# Patient Record
Sex: MALE | Race: OTHER | NOT HISPANIC OR LATINO
[De-identification: names, ages, dates, MRNs, and addresses within clinical notes are randomized per-mention and may not be internally consistent; named-entity substitution may affect disease eponyms.]

---

## 2022-12-15 ENCOUNTER — APPOINTMENT (OUTPATIENT)
Dept: COLORECTAL SURGERY | Facility: CLINIC | Age: 82
End: 2022-12-15
Payer: MEDICARE

## 2022-12-15 VITALS
WEIGHT: 158 LBS | OXYGEN SATURATION: 100 % | HEIGHT: 67 IN | SYSTOLIC BLOOD PRESSURE: 150 MMHG | BODY MASS INDEX: 24.8 KG/M2 | HEART RATE: 91 BPM | TEMPERATURE: 97 F | DIASTOLIC BLOOD PRESSURE: 71 MMHG

## 2022-12-15 DIAGNOSIS — Z80.0 FAMILY HISTORY OF MALIGNANT NEOPLASM OF DIGESTIVE ORGANS: ICD-10-CM

## 2022-12-15 DIAGNOSIS — G57.93 UNSPECIFIED MONONEUROPATHY OF BILATERAL LOWER LIMBS: ICD-10-CM

## 2022-12-15 DIAGNOSIS — E13.9 OTHER SPECIFIED DIABETES MELLITUS W/OUT COMPLICATIONS: ICD-10-CM

## 2022-12-15 DIAGNOSIS — Z86.79 PERSONAL HISTORY OF OTHER DISEASES OF THE CIRCULATORY SYSTEM: ICD-10-CM

## 2022-12-15 DIAGNOSIS — Z86.39 PERSONAL HISTORY OF OTHER ENDOCRINE, NUTRITIONAL AND METABOLIC DISEASE: ICD-10-CM

## 2022-12-15 DIAGNOSIS — K63.5 POLYP OF COLON: ICD-10-CM

## 2022-12-15 DIAGNOSIS — Z87.891 PERSONAL HISTORY OF NICOTINE DEPENDENCE: ICD-10-CM

## 2022-12-15 DIAGNOSIS — Z83.3 FAMILY HISTORY OF DIABETES MELLITUS: ICD-10-CM

## 2022-12-15 PROBLEM — Z00.00 ENCOUNTER FOR PREVENTIVE HEALTH EXAMINATION: Status: ACTIVE | Noted: 2022-12-15

## 2022-12-15 PROCEDURE — 99204 OFFICE O/P NEW MOD 45 MIN: CPT

## 2022-12-15 RX ORDER — APIXABAN 2.5 MG/1
2.5 TABLET, FILM COATED ORAL
Refills: 0 | Status: ACTIVE | COMMUNITY

## 2022-12-15 RX ORDER — ROSUVASTATIN CALCIUM 5 MG/1
5 TABLET, FILM COATED ORAL DAILY
Refills: 0 | Status: ACTIVE | COMMUNITY

## 2022-12-15 RX ORDER — GABAPENTIN 600 MG/1
600 TABLET, COATED ORAL 3 TIMES DAILY
Refills: 0 | Status: ACTIVE | COMMUNITY

## 2022-12-15 RX ORDER — METFORMIN HYDROCHLORIDE 1000 MG/1
1000 TABLET, COATED ORAL
Refills: 0 | Status: ACTIVE | COMMUNITY

## 2022-12-15 RX ORDER — METOPROLOL SUCCINATE 25 MG/1
25 TABLET, EXTENDED RELEASE ORAL DAILY
Refills: 0 | Status: ACTIVE | COMMUNITY

## 2022-12-15 RX ORDER — DULAGLUTIDE 4.5 MG/.5ML
INJECTION, SOLUTION SUBCUTANEOUS
Refills: 0 | Status: ACTIVE | COMMUNITY

## 2022-12-15 RX ORDER — CLOPIDOGREL 75 MG/1
75 TABLET, FILM COATED ORAL DAILY
Refills: 0 | Status: ACTIVE | COMMUNITY

## 2022-12-15 RX ORDER — LISINOPRIL 10 MG/1
10 TABLET ORAL DAILY
Refills: 0 | Status: ACTIVE | COMMUNITY

## 2022-12-15 RX ORDER — FUROSEMIDE 20 MG/1
20 TABLET ORAL DAILY
Refills: 0 | Status: ACTIVE | COMMUNITY

## 2022-12-15 RX ORDER — ACETAMINOPHEN 500 MG/1
500 TABLET ORAL
Refills: 0 | Status: ACTIVE | COMMUNITY

## 2022-12-15 NOTE — REASON FOR VISIT
[Initial Evaluation] : an initial evaluation [Family Member] : family member [FreeTextEntry1] : Referred by Dr. Ortiz

## 2022-12-15 NOTE — ASSESSMENT
[FreeTextEntry1] : 82 year old on plavix and eliquis who was found to have 2 cm transverse colon and 35 mm sessile cecal polyp on recently colonoscopy.  Bx not done because of anticoagulation and anti platelet agents.  Sent for evaluation.\par Patient is asymptomatic but tends to be bit constipated at times.\par \par He is active and ambulates out of house frequently.  Can walk 3 blocks.  \par \par Recommended full medical clearance, cessation of eliquis and plavix and then colonoscopy and possible ESD/EMR in OR.  If scope removal not possible then immediate right or extended right colecotomy (to get transverse colon polyp if not removable with scope.\par \par PMD and cardiac clearance needed.  \par \par Option of doing nothing discussed. \par risks and benefits discussed (leak, abscess, bleeding, transfusion, medical and other risks).\par topic of anticoagulation, anti platelet agents.  ? need for lovenox periop.\par \par Drawings and all handouts given.   \par

## 2022-12-15 NOTE — HISTORY OF PRESENT ILLNESS
[FreeTextEntry1] : PMD: Tayo Vidal, \par Cardiologist: José \par \par 83 y/o M, referred by Dr. Ortiz after Polyps found on colonoscopy. Colonoscopy was done on 8/2/22 by Dr. Ortiz and findings were a 19mm sessile polyp in the transverse colon and 35mm sessile polypoid lesion in the ascending colon. No biopsy done. Patient is here to consult to see if he needs surgery. \par Denies abdominal pain, n/v/d or constipation. Denies rectal bleeding. Bowel movements are daily and they are soft and large. Evacuation is complete. Denies need to strain. Hx. of hemorrhoids. Used cream in the past and was resolved. No surgery for hemorrhoids. Denies any changes in bowel habits. \par Had Partial gastrectomy in 1960s for ulcer. \par \par \par PSH\par CABG  2009\par Gastrectomy  1968 for PUD\par Corneal transplant\par Other eye surgery\par inguinal hernia repair  long time ago\par \par PMH\par DVT  last year\par \par Med\par Elqiuis\par plavix\par metformin\par trulicity weekly\par lisinopril\par metoprolol\par lasix\par statin\par \par Allergies: none\par \par PMH:\par DVT Right leg (last 1-2 years)\par card: no MI, + CAD and CABG\par pulm: former smoker, no smoking x 20-30 years.  No asthma, bronchtis\par +DM x many years  (checks finger sticks intermittently)\par no CVA or seizure.  No TIA\par no healing issues\par : no stones, nocuturia 3-4 x/night, no UTI, no hematuria

## 2022-12-15 NOTE — PHYSICAL EXAM
[Nonprolapsing] : a nonprolapsing (grade I) [Skin Tags] : residual hemorrhoidal skin tags were noted [Normal] : was normal [None] : there was no rectal mass  [Normal Breath Sounds] : Normal breath sounds [Normal Heart Sounds] : normal heart sounds [2+] : left 2+ [No Rash or Lesion] : No rash or lesion [Alert] : alert [Oriented to Person] : oriented to person [Oriented to Place] : oriented to place [Oriented to Time] : oriented to time [Calm] : calm [Abdomen Masses] : No abdominal masses [Abdomen Tenderness] : ~T No ~M abdominal tenderness [Excoriation] : no perianal excoriation [Fistula] : no fistulas [Wart] : no warts [Ulcer ___ cm] : no ulcers [Tender, Swollen] : nontender, non-swollen [Thrombosed] : that was not thrombosed [Stool Sample Taken] : no stool obtained on rectal exam [Gross Blood] : no gross blood [de-identified] : upper midline scar, non tender, moderately obese, non tender, no groin nodes [de-identified] : few tags. Digital: rectum 60% filled with stool, no masses, tone WNL [de-identified] : NAD

## 2022-12-15 NOTE — REVIEW OF SYSTEMS
[Eye Pain] : eye pain [Eyesight Problems] : eyesight problems [SOB on Exertion] : shortness of breath during exertion [Limb Swelling] : limb swelling [Depression] : depression [Easy Bleeding] : a tendency for easy bleeding [Easy Bruising] : a tendency for easy bruising [Negative] : Endocrine [Fever] : no fever [Chills] : no chills [Feeling Poorly] : not feeling poorly [Feeling Tired] : not feeling tired [Recent Weight Gain (___ Lbs)] : no recent weight gain [Recent Weight Loss (___ Lbs)] : no recent weight loss [Earache] : no earache [Loss Of Hearing] : no hearing loss [Nosebleeds] : no nosebleeds [Nasal Discharge] : no nasal discharge [Sore Throat] : no sore throat [Hoarseness] : no hoarseness [Heart Rate Is Slow] : the heart rate was not slow [Heart Rate Is Fast] : the heart rate was not fast [Chest Pain] : no chest pain [Palpitations] : no palpitations [Leg Claudication] : no intermittent leg claudication [Lower Ext Edema] : no extremity edema [Shortness Of Breath] : no shortness of breath [Wheezing] : no wheezing [Cough] : no cough [Orthopnea] : no orthopnea [PND] : no PND [Abdominal Pain] : no abdominal pain [Vomiting] : no vomiting [Constipation] : no constipation [Diarrhea] : no diarrhea [Heartburn] : no heartburn [Melena] : no melena [Skin Lesions] : no skin lesions [Skin Wound] : no skin wound [Itching] : no itching [Change In A Mole] : no change in a mole [Dry Skin] : no dry skin [An Unusual Growth] : no unusual growth on the skin [Confused] : no confusion [Convulsions] : no convulsions [Dizziness] : no dizziness [Fainting] : no fainting [Limb Weakness] : no limb weakness [Difficulty Walking] : no difficulty walking [Suicidal] : not suicidal [Sleep Disturbances] : no sleep disturbances [Anxiety] : no anxiety [Change In Personality] : no personality change [Emotional Problems] : no emotional problems [FreeTextEntry6] : Able to walk only 2-3 blocks and stop for 2 minutes to catch breath.  [FreeTextEntry3] : Right eye removed.  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 Has pain in the left eye sometimes.  [FreeTextEntry9] : B/L LE swelling. Taking Laxis [de-identified] : Thinks he is mildly depressed but not on medication.  [de-identified] : On Plavix and eliquis

## 2022-12-27 DIAGNOSIS — Z20.822 ENCOUNTER FOR PREPROCEDURAL LABORATORY EXAMINATION: ICD-10-CM

## 2022-12-27 DIAGNOSIS — Z01.812 ENCOUNTER FOR PREPROCEDURAL LABORATORY EXAMINATION: ICD-10-CM

## 2022-12-27 RX ORDER — METRONIDAZOLE 500 MG/1
500 TABLET ORAL
Qty: 6 | Refills: 0 | Status: ACTIVE | COMMUNITY
Start: 2022-12-27 | End: 1900-01-01

## 2022-12-27 RX ORDER — NEOMYCIN SULFATE 500 MG/1
500 TABLET ORAL
Qty: 6 | Refills: 0 | Status: ACTIVE | COMMUNITY
Start: 2022-12-27 | End: 1900-01-01

## 2023-01-04 ENCOUNTER — NON-APPOINTMENT (OUTPATIENT)
Age: 83
End: 2023-01-04

## 2023-01-06 ENCOUNTER — OUTPATIENT (OUTPATIENT)
Dept: OUTPATIENT SERVICES | Facility: HOSPITAL | Age: 83
LOS: 1 days | End: 2023-01-06

## 2023-01-06 DIAGNOSIS — Z01.818 ENCOUNTER FOR OTHER PREPROCEDURAL EXAMINATION: ICD-10-CM

## 2023-01-09 LAB
ANION GAP SERPL CALC-SCNC: 14 MMOL/L
BASOPHILS # BLD AUTO: 0.05 K/UL
BASOPHILS NFR BLD AUTO: 0.6 %
BUN SERPL-MCNC: 21 MG/DL
CALCIUM SERPL-MCNC: 9.5 MG/DL
CHLORIDE SERPL-SCNC: 111 MMOL/L
CO2 SERPL-SCNC: 22 MMOL/L
CREAT SERPL-MCNC: 1.02 MG/DL
EGFR: 74 ML/MIN/1.73M2
EOSINOPHIL # BLD AUTO: 0.57 K/UL
EOSINOPHIL NFR BLD AUTO: 7.2 %
GLUCOSE SERPL-MCNC: 125 MG/DL
HCT VFR BLD CALC: 33.1 %
HGB BLD-MCNC: 9.5 G/DL
IMM GRANULOCYTES NFR BLD AUTO: 0.6 %
LYMPHOCYTES # BLD AUTO: 1.02 K/UL
LYMPHOCYTES NFR BLD AUTO: 12.9 %
MAN DIFF?: NORMAL
MCHC RBC-ENTMCNC: 25.1 PG
MCHC RBC-ENTMCNC: 28.7 GM/DL
MCV RBC AUTO: 87.6 FL
MONOCYTES # BLD AUTO: 0.75 K/UL
MONOCYTES NFR BLD AUTO: 9.5 %
NEUTROPHILS # BLD AUTO: 5.48 K/UL
NEUTROPHILS NFR BLD AUTO: 69.2 %
PLATELET # BLD AUTO: 290 K/UL
POTASSIUM SERPL-SCNC: 5.5 MMOL/L
RBC # BLD: 3.78 M/UL
RBC # FLD: 17.4 %
SARS-COV-2 N GENE NPH QL NAA+PROBE: DETECTED
SODIUM SERPL-SCNC: 147 MMOL/L
WBC # FLD AUTO: 7.92 K/UL

## 2023-01-11 PROBLEM — I10 ESSENTIAL (PRIMARY) HYPERTENSION: Chronic | Status: ACTIVE | Noted: 2023-01-09

## 2023-01-11 PROBLEM — I25.10 ATHEROSCLEROTIC HEART DISEASE OF NATIVE CORONARY ARTERY WITHOUT ANGINA PECTORIS: Chronic | Status: ACTIVE | Noted: 2023-01-09

## 2023-01-11 PROBLEM — E78.5 HYPERLIPIDEMIA, UNSPECIFIED: Chronic | Status: ACTIVE | Noted: 2023-01-09

## 2023-01-11 PROBLEM — G62.9 POLYNEUROPATHY, UNSPECIFIED: Chronic | Status: ACTIVE | Noted: 2023-01-09

## 2023-01-16 VITALS
OXYGEN SATURATION: 99 % | HEART RATE: 94 BPM | DIASTOLIC BLOOD PRESSURE: 75 MMHG | WEIGHT: 150.58 LBS | TEMPERATURE: 98 F | HEIGHT: 66 IN | RESPIRATION RATE: 18 BRPM | SYSTOLIC BLOOD PRESSURE: 133 MMHG

## 2023-01-16 NOTE — H&P ADULT - HISTORY OF PRESENT ILLNESS
82 year old on plavix and eliquis who was found to have 2 cm transverse colon and 35 mm sessile cecal polyp on recently colonoscopy. Bx not done because of anticoagulation and anti platelet agents. Sent for evaluation.  Patient is asymptomatic but tends to be bit constipated at times.    He is active and ambulates out of house frequently. Can walk 3 blocks.       PSH  CABG 2009  Gastrectomy 1968 for PUD  Corneal transplant  Other eye surgery  inguinal hernia repair long time ago    PMH  DVT last year    Med  Elqiuis  plavix  metformin  trulicity weekly  lisinopril  metoprolol  lasix  statin    Allergies: none

## 2023-01-16 NOTE — ASU PREOP CHECKLIST - SELECT TESTS ORDERED
CBC/CMP/PT/PTT/INR HgBA1C 7.0/CBC/CMP/PT/PTT/INR/EKG/COVID-19 HgBA1C 7.0// FS/CBC/CMP/PT/PTT/INR/Type and Screen/EKG/POCT Blood Glucose/COVID-19

## 2023-01-16 NOTE — H&P ADULT - NSHPREVIEWOFSYSTEMS_GEN_ALL_CORE
Constitutional: no fever, no chills, not feeling poorly, not feeling tired, no recent weight gain and no recent weight loss.   Eyes: eye pain and eyesight problems . Right eye removed. 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 Has pain in the left eye sometimes.   ENT: no earache, no hearing loss, no nosebleeds, no nasal discharge, no sore throat and no hoarseness.   Cardiovascular: the heart rate was not slow, the heart rate was not fast, no chest pain, no palpitations, no intermittent leg claudication and no extremity edema.   Respiratory: shortness of breath during exertion, but no shortness of breath, no wheezing, no cough, no orthopnea and no PND . Able to walk only 2-3 blocks and stop for 2 minutes to catch breath.   Gastrointestinal: no abdominal pain, no vomiting, no constipation, no diarrhea, no heartburn and no melena.   Musculoskeletal: limb swelling . B/L LE swelling. Taking Laxis.   Integumentary: no skin lesions, no skin wound, no itching, no change in a mole, no dry skin and no unusual growth on the skin.   Neurological: no confusion, no convulsions, no dizziness, no limb weakness and no difficulty walking    no fainting.   Psychiatric: depression, but not suicidal, no sleep disturbances, no anxiety, no personality change and no emotional problems . Thinks he is mildly depressed but not on medication.   Hematologic/Lymphatic: a tendency for easy bleeding and a tendency for easy bruising . On Plavix and eliquis.   Constitutional, ENT, Cardiovascular, Gastrointestinal, Integumentary, Neurological and Endocrine are otherwise negative.

## 2023-01-16 NOTE — H&P ADULT - NSHPPHYSICALEXAM_GEN_ALL_CORE
Gastrointestinal: No abdominal masses. No abdominal tenderness. upper midline scar, non tender, moderately obese, non tender, no groin nodes.   Rectal :. few tags. Digital: rectum 60% filled with stool, no masses, tone WNL. No anal fissures seen. Perianal Skin: no perianal excoriation, no fistulas, no warts and no ulcers. A nonprolapsing (grade I) nontender, non-swollen internal hemorrhoid. Nontender, non-swollen external hemorrhoid that was not thrombosed . residual hemorrhoidal skin tags were noted. The sphincter tone was normal. There was no rectal tenderness present. There was no rectal abscess. There was no rectal mass. no stool obtained on rectal exam and no gross blood.   General Appearance: NAD.   Respiratory: Normal breath sounds.   Cardiovascular: normal heart sounds. Dorsalis pedis: right 2+ and left 2+.   Skin:. no rash or lesion.   Neurologic: alert, oriented to person, oriented to place and oriented to time.   Psychiatric: calm.

## 2023-01-16 NOTE — ASU PATIENT PROFILE, ADULT - PAIN CHRONIC, PROFILE
12.7   5.54  )-----------( 151      ( 25 Aug 2021 22:51 )             35.6       08-25    130<L>  |  91<L>  |  15  ----------------------------<  101<H>  3.4<L>   |  20  |  1.3    Ca    9.0      25 Aug 2021 22:51  Mg     1.1     08-25    TPro  6.6  /  Alb  4.4  /  TBili  0.9  /  DBili  x   /  AST  36  /  ALT  18  /  AlkPhos  53  08-25                  PT/INR - ( 25 Aug 2021 22:51 )   PT: 11.70 sec;   INR: 1.02 ratio         PTT - ( 25 Aug 2021 22:51 )  PTT:30.1 sec    Lactate Trend      CARDIAC MARKERS ( 25 Aug 2021 22:51 )  x     / <0.01 ng/mL / x     / x     / x            CAPILLARY BLOOD GLUCOSE      POCT Blood Glucose.: 107 mg/dL (25 Aug 2021 22:42) no

## 2023-01-16 NOTE — H&P ADULT - ASSESSMENT
This is a 87 yo gentleman with multiple comorbidities on Plavix/Eliquis. He has 2 large polyps ; 35mm in the cecum and 2cm in the transverse colon.  He is here for endoscopic removal in the OR +/- Lap right colectomy .  1. Consent 2. Heparin 5000U sc. 3. Type /Screen

## 2023-01-16 NOTE — ASU PATIENT PROFILE, ADULT - FALL HARM RISK - RISK INTERVENTIONS

## 2023-01-16 NOTE — ASU PATIENT PROFILE, ADULT - FALL HARM RISK - UNIVERSAL INTERVENTIONS
Bed in lowest position, wheels locked, appropriate side rails in place/Call bell, personal items and telephone in reach/Instruct patient to call for assistance before getting out of bed or chair/Non-slip footwear when patient is out of bed/[place] to call system/Physically safe environment - no spills, clutter or unnecessary equipment/Purposeful Proactive Rounding/Room/bathroom lighting operational, light cord in reach

## 2023-01-16 NOTE — ASU PATIENT PROFILE, ADULT - NSICDXPASTMEDICALHX_GEN_ALL_CORE_FT
PAST MEDICAL HISTORY:  CAD (coronary artery disease)     Deep vein thrombosis (DVT) 5/2021    DM (diabetes mellitus)     HLD (hyperlipidemia)     HTN (hypertension)     Neuropathy     Renal dysfunction      PAST MEDICAL HISTORY:  CAD (coronary artery disease)     Deep vein thrombosis (DVT) 5/2021 right LE    DM (diabetes mellitus)     HLD (hyperlipidemia)     HTN (hypertension)     Neuropathy     Renal dysfunction

## 2023-01-16 NOTE — H&P ADULT - NSICDXPASTMEDICALHX_GEN_ALL_CORE_FT
PAST MEDICAL HISTORY:  CAD (coronary artery disease)     Deep vein thrombosis (DVT) 5/2021 right LE    DM (diabetes mellitus)     HLD (hyperlipidemia)     HTN (hypertension)     Neuropathy     Renal dysfunction

## 2023-01-16 NOTE — H&P ADULT - NSICDXPASTSURGICALHX_GEN_ALL_CORE_FT
PAST SURGICAL HISTORY:  H/O colonoscopy     H/O coronary artery bypass surgery 2009    H/O hernia repair     History of gastrectomy 1968    Post corneal transplant b/l    S/P eye surgery

## 2023-01-16 NOTE — ASU PATIENT PROFILE, ADULT - NSICDXPASTSURGICALHX_GEN_ALL_CORE_FT
PAST SURGICAL HISTORY:  H/O colonoscopy     H/O coronary artery bypass surgery 2009    H/O hernia repair     History of gastrectomy 1968    Post corneal transplant     S/P eye surgery      PAST SURGICAL HISTORY:  H/O colonoscopy     H/O coronary artery bypass surgery 2009    H/O hernia repair     History of gastrectomy 1968    Post corneal transplant b/l    S/P eye surgery

## 2023-01-17 ENCOUNTER — TRANSCRIPTION ENCOUNTER (OUTPATIENT)
Age: 83
End: 2023-01-17

## 2023-01-17 ENCOUNTER — INPATIENT (INPATIENT)
Facility: HOSPITAL | Age: 83
LOS: 4 days | Discharge: HOME | DRG: 330 | End: 2023-01-22
Attending: COLON & RECTAL SURGERY | Admitting: COLON & RECTAL SURGERY
Payer: MEDICARE

## 2023-01-17 ENCOUNTER — APPOINTMENT (OUTPATIENT)
Dept: COLORECTAL SURGERY | Facility: HOSPITAL | Age: 83
End: 2023-01-17

## 2023-01-17 DIAGNOSIS — Z98.890 OTHER SPECIFIED POSTPROCEDURAL STATES: Chronic | ICD-10-CM

## 2023-01-17 DIAGNOSIS — Z94.7 CORNEAL TRANSPLANT STATUS: Chronic | ICD-10-CM

## 2023-01-17 DIAGNOSIS — Z90.3 ACQUIRED ABSENCE OF STOMACH [PART OF]: Chronic | ICD-10-CM

## 2023-01-17 DIAGNOSIS — Z95.1 PRESENCE OF AORTOCORONARY BYPASS GRAFT: Chronic | ICD-10-CM

## 2023-01-17 LAB
ANION GAP SERPL CALC-SCNC: 12 MMOL/L — SIGNIFICANT CHANGE UP (ref 5–17)
BASE EXCESS BLDA CALC-SCNC: -3 MMOL/L — LOW (ref -2–3)
BLD GP AB SCN SERPL QL: NEGATIVE — SIGNIFICANT CHANGE UP
BUN SERPL-MCNC: 15 MG/DL — SIGNIFICANT CHANGE UP (ref 7–23)
CA-I BLDA-SCNC: 1.19 MMOL/L — SIGNIFICANT CHANGE UP (ref 1.15–1.33)
CALCIUM SERPL-MCNC: 8.5 MG/DL — SIGNIFICANT CHANGE UP (ref 8.4–10.5)
CHLORIDE SERPL-SCNC: 102 MMOL/L — SIGNIFICANT CHANGE UP (ref 96–108)
CO2 BLDA-SCNC: 25 MMOL/L — HIGH (ref 19–24)
CO2 SERPL-SCNC: 21 MMOL/L — LOW (ref 22–31)
COHGB MFR BLDA: 1.3 % — SIGNIFICANT CHANGE UP
CREAT SERPL-MCNC: 1.09 MG/DL — SIGNIFICANT CHANGE UP (ref 0.5–1.3)
EGFR: 68 ML/MIN/1.73M2 — SIGNIFICANT CHANGE UP
GLUCOSE BLDA-MCNC: 133 MG/DL — HIGH (ref 70–99)
GLUCOSE BLDC GLUCOMTR-MCNC: 103 MG/DL — HIGH (ref 70–99)
GLUCOSE BLDC GLUCOMTR-MCNC: 160 MG/DL — HIGH (ref 70–99)
GLUCOSE BLDC GLUCOMTR-MCNC: 185 MG/DL — HIGH (ref 70–99)
GLUCOSE SERPL-MCNC: 203 MG/DL — HIGH (ref 70–99)
HCO3 BLDA-SCNC: 23 MMOL/L — SIGNIFICANT CHANGE UP (ref 21–28)
HCT VFR BLD CALC: 33.5 % — LOW (ref 39–50)
HGB BLD-MCNC: 10 G/DL — LOW (ref 13–17)
HGB BLDA-MCNC: 9.1 G/DL — LOW (ref 12.6–17.4)
MAGNESIUM SERPL-MCNC: 2 MG/DL — SIGNIFICANT CHANGE UP (ref 1.6–2.6)
MCHC RBC-ENTMCNC: 25.8 PG — LOW (ref 27–34)
MCHC RBC-ENTMCNC: 29.9 GM/DL — LOW (ref 32–36)
MCV RBC AUTO: 86.6 FL — SIGNIFICANT CHANGE UP (ref 80–100)
METHGB MFR BLDA: 0 % — SIGNIFICANT CHANGE UP
NRBC # BLD: 0 /100 WBCS — SIGNIFICANT CHANGE UP (ref 0–0)
OXYHGB MFR BLDA: 86.3 % — LOW (ref 90–95)
PCO2 BLDA: 46 MMHG — SIGNIFICANT CHANGE UP (ref 35–48)
PH BLDA: 7.31 — LOW (ref 7.35–7.45)
PHOSPHATE SERPL-MCNC: 4.4 MG/DL — SIGNIFICANT CHANGE UP (ref 2.5–4.5)
PLATELET # BLD AUTO: 181 K/UL — SIGNIFICANT CHANGE UP (ref 150–400)
PO2 BLDA: 59 MMHG — LOW (ref 83–108)
POTASSIUM BLDA-SCNC: 5.7 MMOL/L — HIGH (ref 3.5–5.1)
POTASSIUM SERPL-MCNC: 5.1 MMOL/L — SIGNIFICANT CHANGE UP (ref 3.5–5.3)
POTASSIUM SERPL-SCNC: 5.1 MMOL/L — SIGNIFICANT CHANGE UP (ref 3.5–5.3)
RBC # BLD: 3.87 M/UL — LOW (ref 4.2–5.8)
RBC # FLD: 19.3 % — HIGH (ref 10.3–14.5)
RH IG SCN BLD-IMP: POSITIVE — SIGNIFICANT CHANGE UP
SAO2 % BLDA: 87.5 % — LOW (ref 94–98)
SODIUM BLDA-SCNC: 134 MMOL/L — LOW (ref 136–145)
SODIUM SERPL-SCNC: 135 MMOL/L — SIGNIFICANT CHANGE UP (ref 135–145)
WBC # BLD: 8.24 K/UL — SIGNIFICANT CHANGE UP (ref 3.8–10.5)
WBC # FLD AUTO: 8.24 K/UL — SIGNIFICANT CHANGE UP (ref 3.8–10.5)

## 2023-01-17 PROCEDURE — 45385 COLONOSCOPY W/LESION REMOVAL: CPT

## 2023-01-17 PROCEDURE — 44205 LAP COLECTOMY PART W/ILEUM: CPT

## 2023-01-17 PROCEDURE — 45381 COLONOSCOPY SUBMUCOUS NJX: CPT

## 2023-01-17 DEVICE — STAPLER COVIDIEN ENDO GIA 80-3.8MM BLUE RELOAD: Type: IMPLANTABLE DEVICE | Status: FUNCTIONAL

## 2023-01-17 DEVICE — STAPLER COVIDIEN ENDO GIA 80-3.8MM BLUE: Type: IMPLANTABLE DEVICE | Status: FUNCTIONAL

## 2023-01-17 RX ORDER — SODIUM CHLORIDE 9 MG/ML
1000 INJECTION, SOLUTION INTRAVENOUS
Refills: 0 | Status: DISCONTINUED | OUTPATIENT
Start: 2023-01-17 | End: 2023-01-22

## 2023-01-17 RX ORDER — SODIUM CHLORIDE 9 MG/ML
1000 INJECTION, SOLUTION INTRAVENOUS
Refills: 0 | Status: DISCONTINUED | OUTPATIENT
Start: 2023-01-17 | End: 2023-01-18

## 2023-01-17 RX ORDER — ACETAMINOPHEN 500 MG
1000 TABLET ORAL ONCE
Refills: 0 | Status: COMPLETED | OUTPATIENT
Start: 2023-01-17 | End: 2023-01-17

## 2023-01-17 RX ORDER — DEXTROSE 50 % IN WATER 50 %
15 SYRINGE (ML) INTRAVENOUS ONCE
Refills: 0 | Status: DISCONTINUED | OUTPATIENT
Start: 2023-01-17 | End: 2023-01-22

## 2023-01-17 RX ORDER — DEXTROSE 50 % IN WATER 50 %
12.5 SYRINGE (ML) INTRAVENOUS ONCE
Refills: 0 | Status: DISCONTINUED | OUTPATIENT
Start: 2023-01-17 | End: 2023-01-22

## 2023-01-17 RX ORDER — HEPARIN SODIUM 5000 [USP'U]/ML
5000 INJECTION INTRAVENOUS; SUBCUTANEOUS EVERY 12 HOURS
Refills: 0 | Status: DISCONTINUED | OUTPATIENT
Start: 2023-01-18 | End: 2023-01-18

## 2023-01-17 RX ORDER — DEXTROSE 50 % IN WATER 50 %
25 SYRINGE (ML) INTRAVENOUS ONCE
Refills: 0 | Status: DISCONTINUED | OUTPATIENT
Start: 2023-01-17 | End: 2023-01-22

## 2023-01-17 RX ORDER — GLUCAGON INJECTION, SOLUTION 0.5 MG/.1ML
1 INJECTION, SOLUTION SUBCUTANEOUS ONCE
Refills: 0 | Status: DISCONTINUED | OUTPATIENT
Start: 2023-01-17 | End: 2023-01-22

## 2023-01-17 RX ORDER — INSULIN LISPRO 100/ML
VIAL (ML) SUBCUTANEOUS
Refills: 0 | Status: DISCONTINUED | OUTPATIENT
Start: 2023-01-17 | End: 2023-01-22

## 2023-01-17 RX ORDER — HEPARIN SODIUM 5000 [USP'U]/ML
5000 INJECTION INTRAVENOUS; SUBCUTANEOUS ONCE
Refills: 0 | Status: COMPLETED | OUTPATIENT
Start: 2023-01-17 | End: 2023-01-17

## 2023-01-17 RX ORDER — ACETAMINOPHEN 500 MG
1000 TABLET ORAL EVERY 6 HOURS
Refills: 0 | Status: COMPLETED | OUTPATIENT
Start: 2023-01-17 | End: 2023-01-18

## 2023-01-17 RX ORDER — LABETALOL HCL 100 MG
10 TABLET ORAL ONCE
Refills: 0 | Status: COMPLETED | OUTPATIENT
Start: 2023-01-17 | End: 2023-01-17

## 2023-01-17 RX ORDER — HYDROMORPHONE HYDROCHLORIDE 2 MG/ML
0.2 INJECTION INTRAMUSCULAR; INTRAVENOUS; SUBCUTANEOUS EVERY 4 HOURS
Refills: 0 | Status: DISCONTINUED | OUTPATIENT
Start: 2023-01-17 | End: 2023-01-18

## 2023-01-17 RX ADMIN — Medication 10 MILLIGRAM(S): at 23:36

## 2023-01-17 RX ADMIN — HEPARIN SODIUM 5000 UNIT(S): 5000 INJECTION INTRAVENOUS; SUBCUTANEOUS at 11:17

## 2023-01-17 RX ADMIN — Medication 1000 MILLIGRAM(S): at 23:33

## 2023-01-17 RX ADMIN — Medication 10 MILLIGRAM(S): at 22:53

## 2023-01-17 RX ADMIN — Medication 2: at 23:49

## 2023-01-17 RX ADMIN — Medication 1000 MILLIGRAM(S): at 11:17

## 2023-01-17 RX ADMIN — Medication 400 MILLIGRAM(S): at 23:27

## 2023-01-17 NOTE — ASU DISCHARGE PLAN (ADULT/PEDIATRIC) - NS MD DC FALL RISK RISK
For information on Fall & Injury Prevention, visit: [email or web address] OR  [email or web address] OR  https://www.cdc.gov/steadi/patient.html

## 2023-01-17 NOTE — PRE-ANESTHESIA EVALUATION ADULT - NSANTHPMHFT_GEN_ALL_CORE
81yo M with PMH CAD sp CABG and DVT on elliquis and plavix, who was discovered on colonscopy to have multiple polypoid masses.  Because patient was on anticoagulation/antiplatelets, no resection performed. Presents for Colonoscopy, EUA, Possible Endoscopic Resection vs Partial Colectomy    PMH:   DVT 2022  CAD s.p CABG 2009  DM2  HTn  HLD  Neuropathy  CKD  Anemia - s.p trans dec 2022  Colon Mass  BPH    PSH:  CABG 2009  gastrectomy 1968  corneal transplant 1968, ~2018  Ing Hernia Repair  Colonoscopy    NKDA

## 2023-01-17 NOTE — PRE-ANESTHESIA EVALUATION ADULT - NSPROPOSEDPROCEDFT_GEN_ALL_CORE
Colonoscopy, EUA, Possible Colectomy Colonoscopy, EUA, Possible Endoscopic Resection vs Partial Colectomy

## 2023-01-17 NOTE — ASU DISCHARGE PLAN (ADULT/PEDIATRIC) - CARE PROVIDER_API CALL
Filemon Frankel)  ColonRectal Surgery  [address], [address], [place] [address]  Phone: [phone]  Fax: [phone]  Follow Up Time:

## 2023-01-17 NOTE — PRE-ANESTHESIA EVALUATION ADULT - NSRADCARDRESULTSFT_GEN_ALL_CORE
Stress trest 9/2022: - for ischemia or perfusion deficit, no ekg changes, no sx    Echo 9/2022: normal biv function with LVH, MAC, aortic sclerosis without stenosis, no PHTN, mild MR    EKG: NSR with PACs, no ischemia, LAD, normal intervals

## 2023-01-17 NOTE — PRE-ANESTHESIA EVALUATION ADULT - NSANTHOSAYNRD_GEN_A_CORE
No. RAFIQ screening performed.  STOP BANG Legend: 0-2 = LOW Risk; 3-4 = INTERMEDIATE Risk; 5-8 = HIGH Risk

## 2023-01-17 NOTE — BRIEF OPERATIVE NOTE - NSICDXBRIEFPROCEDURE_GEN_ALL_CORE_FT
PROCEDURES:  Colonoscopy, with polypectomy and biopsy if indicated 17-Jan-2023 13:43:12  Santi Fraga   PROCEDURES:  Colonoscopy, with polypectomy and biopsy if indicated 17-Jan-2023 13:43:12  Santi Fraga  Laparoscopic right hemicolectomy 17-Jan-2023 22:18:45  Santi Fraga

## 2023-01-17 NOTE — PRE-ANESTHESIA EVALUATION ADULT - NSANTHPEFT_GEN_ALL_CORE
General: Appearance is consistent with chronological age. No abnormal facies. Beard  Airway:  See Mallampati score  EENT: on L Anicteric sclera; oropharynx clear, moist mucus membranes; R eye with corneal implant closed at rest  Cardiovascular:  Regular rate and rhythm  Respiratory: Unlabored breathing  Neurological: Awake and alert, moves all extremities  Constitutional: MET>4

## 2023-01-17 NOTE — BRIEF OPERATIVE NOTE - OPERATION/FINDINGS
Colonoscopy to cecum. EMR /Polypectomy of polyps. Residual polyp in  ascending colon was not in good position for EMR. Decision for Laparoscopic Right hemicolectomy. Liseth technique. 12mm camera port at periumbilical incision. 4 other 5mm working ports inserted.  Extensive adhesiolysis performed , transverse colon to anterior abd wall.  Lateral to medial mobilisation. Terminal ileum was adherent and mobilisation was performed. Transverse colon was mobilised. Hepatic flexure was mobilisaed. Ileocolic pedicle identified and taken with ligature.  Mesocolon divided towards Terminal ileum. Upper midline incision 6cm . Bowel exteriorised. ileocolic Side-side anastomosis using Tera technique. Hemostasis ensured at stapling line. Distal margin 5cm, proximal colon 8cm from large polyp, 7cm of TI divided. Washout performed.   Bony structure in upper midline wound was removed. Closure in usual manner. exparel infiltrated. stapler to skin,

## 2023-01-18 ENCOUNTER — RESULT REVIEW (OUTPATIENT)
Age: 83
End: 2023-01-18

## 2023-01-18 LAB
A1C WITH ESTIMATED AVERAGE GLUCOSE RESULT: 7.2 % — HIGH (ref 4–5.6)
ANION GAP SERPL CALC-SCNC: 10 MMOL/L — SIGNIFICANT CHANGE UP (ref 5–17)
BUN SERPL-MCNC: 15 MG/DL — SIGNIFICANT CHANGE UP (ref 7–23)
CALCIUM SERPL-MCNC: 8.1 MG/DL — LOW (ref 8.4–10.5)
CHLORIDE SERPL-SCNC: 105 MMOL/L — SIGNIFICANT CHANGE UP (ref 96–108)
CO2 SERPL-SCNC: 21 MMOL/L — LOW (ref 22–31)
CREAT SERPL-MCNC: 1.16 MG/DL — SIGNIFICANT CHANGE UP (ref 0.5–1.3)
EGFR: 63 ML/MIN/1.73M2 — SIGNIFICANT CHANGE UP
ESTIMATED AVERAGE GLUCOSE: 160 MG/DL — HIGH (ref 68–114)
GLUCOSE BLDC GLUCOMTR-MCNC: 139 MG/DL — HIGH (ref 70–99)
GLUCOSE BLDC GLUCOMTR-MCNC: 142 MG/DL — HIGH (ref 70–99)
GLUCOSE BLDC GLUCOMTR-MCNC: 158 MG/DL — HIGH (ref 70–99)
GLUCOSE BLDC GLUCOMTR-MCNC: 190 MG/DL — HIGH (ref 70–99)
GLUCOSE SERPL-MCNC: 163 MG/DL — HIGH (ref 70–99)
HCT VFR BLD CALC: 29 % — LOW (ref 39–50)
HGB BLD-MCNC: 8.8 G/DL — LOW (ref 13–17)
MAGNESIUM SERPL-MCNC: 1.9 MG/DL — SIGNIFICANT CHANGE UP (ref 1.6–2.6)
MCHC RBC-ENTMCNC: 25.6 PG — LOW (ref 27–34)
MCHC RBC-ENTMCNC: 30.3 GM/DL — LOW (ref 32–36)
MCV RBC AUTO: 84.3 FL — SIGNIFICANT CHANGE UP (ref 80–100)
NRBC # BLD: 0 /100 WBCS — SIGNIFICANT CHANGE UP (ref 0–0)
PHOSPHATE SERPL-MCNC: 4.1 MG/DL — SIGNIFICANT CHANGE UP (ref 2.5–4.5)
PLATELET # BLD AUTO: 182 K/UL — SIGNIFICANT CHANGE UP (ref 150–400)
POTASSIUM SERPL-MCNC: 5 MMOL/L — SIGNIFICANT CHANGE UP (ref 3.5–5.3)
POTASSIUM SERPL-SCNC: 5 MMOL/L — SIGNIFICANT CHANGE UP (ref 3.5–5.3)
RBC # BLD: 3.44 M/UL — LOW (ref 4.2–5.8)
RBC # FLD: 19.3 % — HIGH (ref 10.3–14.5)
SODIUM SERPL-SCNC: 136 MMOL/L — SIGNIFICANT CHANGE UP (ref 135–145)
WBC # BLD: 7.22 K/UL — SIGNIFICANT CHANGE UP (ref 3.8–10.5)
WBC # FLD AUTO: 7.22 K/UL — SIGNIFICANT CHANGE UP (ref 3.8–10.5)

## 2023-01-18 PROCEDURE — 88304 TISSUE EXAM BY PATHOLOGIST: CPT | Mod: 26

## 2023-01-18 PROCEDURE — 88309 TISSUE EXAM BY PATHOLOGIST: CPT | Mod: 26

## 2023-01-18 RX ORDER — OXYCODONE HYDROCHLORIDE 5 MG/1
10 TABLET ORAL EVERY 4 HOURS
Refills: 0 | Status: DISCONTINUED | OUTPATIENT
Start: 2023-01-18 | End: 2023-01-22

## 2023-01-18 RX ORDER — SODIUM CHLORIDE 9 MG/ML
1000 INJECTION, SOLUTION INTRAVENOUS
Refills: 0 | Status: DISCONTINUED | OUTPATIENT
Start: 2023-01-18 | End: 2023-01-18

## 2023-01-18 RX ORDER — KETOROLAC TROMETHAMINE 30 MG/ML
15 SYRINGE (ML) INJECTION EVERY 6 HOURS
Refills: 0 | Status: DISCONTINUED | OUTPATIENT
Start: 2023-01-18 | End: 2023-01-19

## 2023-01-18 RX ORDER — OXYCODONE HYDROCHLORIDE 5 MG/1
5 TABLET ORAL EVERY 4 HOURS
Refills: 0 | Status: DISCONTINUED | OUTPATIENT
Start: 2023-01-18 | End: 2023-01-22

## 2023-01-18 RX ORDER — SODIUM CHLORIDE 9 MG/ML
1000 INJECTION, SOLUTION INTRAVENOUS
Refills: 0 | Status: DISCONTINUED | OUTPATIENT
Start: 2023-01-18 | End: 2023-01-20

## 2023-01-18 RX ORDER — HEPARIN SODIUM 5000 [USP'U]/ML
5000 INJECTION INTRAVENOUS; SUBCUTANEOUS EVERY 8 HOURS
Refills: 0 | Status: DISCONTINUED | OUTPATIENT
Start: 2023-01-18 | End: 2023-01-20

## 2023-01-18 RX ORDER — ATORVASTATIN CALCIUM 80 MG/1
20 TABLET, FILM COATED ORAL DAILY
Refills: 0 | Status: DISCONTINUED | OUTPATIENT
Start: 2023-01-18 | End: 2023-01-22

## 2023-01-18 RX ORDER — SODIUM CHLORIDE 9 MG/ML
500 INJECTION, SOLUTION INTRAVENOUS ONCE
Refills: 0 | Status: COMPLETED | OUTPATIENT
Start: 2023-01-18 | End: 2023-01-18

## 2023-01-18 RX ADMIN — Medication 2: at 11:00

## 2023-01-18 RX ADMIN — HEPARIN SODIUM 5000 UNIT(S): 5000 INJECTION INTRAVENOUS; SUBCUTANEOUS at 18:46

## 2023-01-18 RX ADMIN — Medication 15 MILLIGRAM(S): at 23:03

## 2023-01-18 RX ADMIN — HYDROMORPHONE HYDROCHLORIDE 0.2 MILLIGRAM(S): 2 INJECTION INTRAMUSCULAR; INTRAVENOUS; SUBCUTANEOUS at 03:07

## 2023-01-18 RX ADMIN — HEPARIN SODIUM 5000 UNIT(S): 5000 INJECTION INTRAVENOUS; SUBCUTANEOUS at 02:37

## 2023-01-18 RX ADMIN — Medication 15 MILLIGRAM(S): at 08:18

## 2023-01-18 RX ADMIN — Medication 400 MILLIGRAM(S): at 11:01

## 2023-01-18 RX ADMIN — Medication 1000 MILLIGRAM(S): at 06:58

## 2023-01-18 RX ADMIN — ATORVASTATIN CALCIUM 20 MILLIGRAM(S): 80 TABLET, FILM COATED ORAL at 11:00

## 2023-01-18 RX ADMIN — Medication 15 MILLIGRAM(S): at 11:01

## 2023-01-18 RX ADMIN — HYDROMORPHONE HYDROCHLORIDE 0.2 MILLIGRAM(S): 2 INJECTION INTRAMUSCULAR; INTRAVENOUS; SUBCUTANEOUS at 02:37

## 2023-01-18 RX ADMIN — Medication 15 MILLIGRAM(S): at 07:18

## 2023-01-18 RX ADMIN — Medication 400 MILLIGRAM(S): at 06:25

## 2023-01-18 RX ADMIN — Medication 15 MILLIGRAM(S): at 17:17

## 2023-01-18 RX ADMIN — SODIUM CHLORIDE 40 MILLILITER(S): 9 INJECTION, SOLUTION INTRAVENOUS at 07:18

## 2023-01-18 RX ADMIN — Medication 2: at 17:09

## 2023-01-18 RX ADMIN — SODIUM CHLORIDE 1000 MILLILITER(S): 9 INJECTION, SOLUTION INTRAVENOUS at 17:18

## 2023-01-18 RX ADMIN — HEPARIN SODIUM 5000 UNIT(S): 5000 INJECTION INTRAVENOUS; SUBCUTANEOUS at 11:00

## 2023-01-18 NOTE — PHYSICAL THERAPY INITIAL EVALUATION ADULT - GAIT DEVIATIONS NOTED, PT EVAL
pt mildly unsteady with increased postural sway without use of IV pole support/decreased chayo/decreased step length/decreased weight-shifting ability

## 2023-01-18 NOTE — PROGRESS NOTE ADULT - ASSESSMENT
83yo male with a PMhx significant for DVT (R leg), renal dysfunction, HLD, HTN, DM2, CAD, colon polps, PSHx of CABG (2009; on Plavix and Eliquis), gastrectomy for PUD (1968), corneal transplant, inguinal hernia repair. Recent colonscopy significant for 2 cm transverse colon and 35 mm sessile cecal polp. S/p colonoscopy  w. polypectomy and biopsy converted to open right hemicolectomy (1/17).       CLD/ IVF  Pain/nausea control  SCD (LEFT SIDE ONLY)/IS/SQH (q12)  ISS  [place]  Penrose drains

## 2023-01-18 NOTE — PROGRESS NOTE ADULT - ASSESSMENT
s/p Lap Right hemicolectomy . POD1  Uneventful overnight. ERP Protocol.  1. Decrease IVF 2. Add toradol for pain+ oxycodon prn . 3. Follow up Labs . 4. Keep clrs.

## 2023-01-18 NOTE — PROGRESS NOTE ADULT - ASSESSMENT
A/P  check for void  clears as tolerated  IV to 40 ml/hr (ER protocol)  Spirometer use  Clears as tolerated  Heparin 5000 units bid  Discussed with housestaff.

## 2023-01-18 NOTE — PROGRESS NOTE ADULT - ASSESSMENT
83yo male with a PMhx significant for DVT (R leg), renal dysfunction, HLD, HTN, DM2, CAD, colon polps, PSHx of CABG (2009; on Plavix and Eliquis), gastrectomy for PUD (1968), corneal transplant, inguinal hernia repair. S/p colonoscopy converted to open right hemicolectomy (1/18). POC WNL.    CLD/ IVF  Pain/nausea control  SCD (LEFT SIDE ONLY)/IS/SQH (q12)  ISS  [place]  Penrose drains   [place]      83yo male with a PMhx significant for DVT (R leg), renal dysfunction, HLD, HTN, DM2, CAD, colon polps, PSHx of CABG (2009; on Plavix and Eliquis), gastrectomy for PUD (1968), corneal transplant, inguinal hernia repair. S/p colonoscopy converted to open right hemicolectomy (1/17). POC WNL.    CLD/ IVF  Pain/nausea control  SCD (LEFT SIDE ONLY)/IS/SQH (q12)  ISS  [place]  Penrose drains   [place]ional

## 2023-01-18 NOTE — PROGRESS NOTE ADULT - SUBJECTIVE AND OBJECTIVE BOX
SUBJECTIVE: Pt seen and examined at bedside with chief. Pt admits to sore throat. Pain well controlled w/ meds. Minimal PO intake, denies N/V. Denies any BF.    MEDICATIONS  (STANDING):  acetaminophen   IVPB .. 1000 milliGRAM(s) IV Intermittent every 6 hours  atorvastatin 20 milliGRAM(s) Oral daily  dextrose 5%. 1000 milliLiter(s) (100 mL/Hr) IV Continuous <Continuous>  dextrose 5%. 1000 milliLiter(s) (50 mL/Hr) IV Continuous <Continuous>  dextrose 50% Injectable 25 Gram(s) IV Push once  dextrose 50% Injectable 12.5 Gram(s) IV Push once  dextrose 50% Injectable 25 Gram(s) IV Push once  glucagon  Injectable 1 milliGRAM(s) IntraMuscular once  heparin   Injectable 5000 Unit(s) SubCutaneous every 12 hours  insulin lispro (ADMELOG) corrective regimen sliding scale   SubCutaneous three times a day before meals  ketorolac   Injectable 15 milliGRAM(s) IV Push every 6 hours  lactated ringers. 1000 milliLiter(s) (40 mL/Hr) IV Continuous <Continuous>    MEDICATIONS  (PRN):  dextrose Oral Gel 15 Gram(s) Oral once PRN Blood Glucose LESS THAN 70 milliGRAM(s)/deciliter  oxyCODONE    IR 5 milliGRAM(s) Oral every 4 hours PRN Moderate Pain (4 - 6)  oxyCODONE    IR 10 milliGRAM(s) Oral every 4 hours PRN Severe Pain (7 - 10)      Vital Signs Last 24 Hrs  T(C): 36.8 (18 Jan 2023 05:25), Max: 37 (17 Jan 2023 22:08)  T(F): 98.3 (18 Jan 2023 05:25), Max: 98.6 (17 Jan 2023 22:08)  HR: 83 (18 Jan 2023 05:25) (81 - 94)  BP: 137/69 (18 Jan 2023 05:25) (133/75 - 194/93)  BP(mean): 99 (18 Jan 2023 00:45) (99 - 133)  RR: 15 (18 Jan 2023 05:25) (13 - 20)  SpO2: 98% (18 Jan 2023 05:25) (97% - 100%)    Parameters below as of 18 Jan 2023 05:25  Patient On (Oxygen Delivery Method): room air        PHYSICAL EXAM:      Constitutional: A&Ox3    Respiratory: non labored breathing, no respiratory distress    Cardiovascular: NSR, RRR    Gastrointestinal: Soft ND, appropriately tender                 Incision: CDI, dressings in place    Genitourinary: Voiding     Extremities: (-) edema                  I&O's Detail    17 Jan 2023 07:01  -  18 Jan 2023 07:00  --------------------------------------------------------  IN:    IV PiggyBack: 150 mL    Lactated Ringers: 450 mL    Lactated Ringers: 300 mL  Total IN: 900 mL    OUT:    Indwelling Catheter - Urethral (mL): 925 mL  Total OUT: 925 mL    Total NET: -25 mL          LABS:                        8.8    7.22  )-----------( 182      ( 18 Jan 2023 05:30 )             29.0     01-18    136  |  105  |  15  ----------------------------<  163<H>  5.0   |  21<L>  |  1.16    Ca    8.1<L>      18 Jan 2023 05:30  Phos  4.1     01-18  Mg     1.9     01-18            RADIOLOGY & ADDITIONAL STUDIES:

## 2023-01-18 NOTE — PHYSICAL THERAPY INITIAL EVALUATION ADULT - THERAPY FREQUENCY, PT EVAL
Patient educated on frequency of inpatient physical therapy at [facility], patient verbalized understanding./2-3x/week

## 2023-01-18 NOTE — PHYSICAL THERAPY INITIAL EVALUATION ADULT - ADDITIONAL COMMENTS
Pt reports living in apartment without STEVIE, alone. Reports being independent with daily activities without use of DME. Reports recently needing assistance from family members for ADLs such as shopping and cleaning as pt with impaired vision 2/2 recent corneal transplant.

## 2023-01-18 NOTE — PROGRESS NOTE ADULT - SUBJECTIVE AND OBJECTIVE BOX
Uneventful overnight   Comfortable. Pain well controlled  No flatus  . Bp 137/69  Urine output 900s    Subjective:     MEDICATIONS  (STANDING):  acetaminophen   IVPB .. 1000 milliGRAM(s) IV Intermittent every 6 hours  atorvastatin 20 milliGRAM(s) Oral daily  dextrose 5%. 1000 milliLiter(s) (100 mL/Hr) IV Continuous <Continuous>  dextrose 5%. 1000 milliLiter(s) (50 mL/Hr) IV Continuous <Continuous>  dextrose 50% Injectable 25 Gram(s) IV Push once  dextrose 50% Injectable 12.5 Gram(s) IV Push once  dextrose 50% Injectable 25 Gram(s) IV Push once  glucagon  Injectable 1 milliGRAM(s) IntraMuscular once  heparin   Injectable 5000 Unit(s) SubCutaneous every 12 hours  insulin lispro (ADMELOG) corrective regimen sliding scale   SubCutaneous three times a day before meals  ketorolac   Injectable 15 milliGRAM(s) IV Push every 6 hours  lactated ringers. 1000 milliLiter(s) (40 mL/Hr) IV Continuous <Continuous>    MEDICATIONS  (PRN):  dextrose Oral Gel 15 Gram(s) Oral once PRN Blood Glucose LESS THAN 70 milliGRAM(s)/deciliter  oxyCODONE    IR 5 milliGRAM(s) Oral every 4 hours PRN Moderate Pain (4 - 6)  oxyCODONE    IR 10 milliGRAM(s) Oral every 4 hours PRN Severe Pain (7 - 10)      Vital Signs Last 24 Hrs  T(C): 36.8 (18 Jan 2023 05:25), Max: 37 (17 Jan 2023 22:08)  T(F): 98.3 (18 Jan 2023 05:25), Max: 98.6 (17 Jan 2023 22:08)  HR: 83 (18 Jan 2023 05:25) (81 - 94)  BP: 137/69 (18 Jan 2023 05:25) (133/75 - 194/93)  BP(mean): 99 (18 Jan 2023 00:45) (99 - 133)  RR: 15 (18 Jan 2023 05:25) (13 - 20)  SpO2: 98% (18 Jan 2023 05:25) (97% - 100%)    Parameters below as of 18 Jan 2023 05:25  Patient On (Oxygen Delivery Method): room air        Physical Exam  General: NAD, resting comfortably in bed  Pulm: Nonlabored breathing, no respiratory distress  Abd: soft, non distended, incisions c/d/i, non tender  Extrem: WWP, no edema,  Neuro: A/O x 3, CNs II-XII grossly intact, no focal deficits, normal sensation  Pulses: palpable distal pulses     I&O's Summary    17 Jan 2023 07:01  -  18 Jan 2023 07:00  --------------------------------------------------------  IN: 900 mL / OUT: 925 mL / NET: -25 mL                              8.8    7.22  )-----------( 182      ( 18 Jan 2023 05:30 )             29.0       01-17    135  |  102  |  15  ----------------------------<  203<H>  5.1   |  21<L>  |  1.09    Ca    8.5      17 Jan 2023 23:22  Phos  4.4     01-17  Mg     2.0     01-17        Micro:    Imaging:

## 2023-01-18 NOTE — PHYSICAL THERAPY INITIAL EVALUATION ADULT - PERTINENT HX OF CURRENT PROBLEM, REHAB EVAL
81yo male with a PMhx significant for DVT (R leg), renal dysfunction, HLD, HTN, DM2, CAD, colon polps, PSHx of CABG (2009; on Plavix and Eliquis), gastrectomy for PUD (1968), corneal transplant, inguinal hernia repair. Recent colonscopy significant for 2 cm transverse colon and 35 mm sessile cecal polp. S/p colonoscopy  w. polypectomy and biopsy converted to open right hemicolectomy (1/17).

## 2023-01-18 NOTE — PROGRESS NOTE ADULT - SUBJECTIVE AND OBJECTIVE BOX
POD 1  9 Uris    kitchen out this AM but no void yet.  Been ambulated and in chair  Pain controlled  No flatus or BM or N/V.  On clears  Using spirometer    Vital Signs Last 24 Hrs  T(C): 36.7 (18 Jan 2023 13:22), Max: 37 (17 Jan 2023 22:08)  T(F): 98 (18 Jan 2023 13:22), Max: 98.6 (17 Jan 2023 22:08)  HR: 84 (18 Jan 2023 13:22) (81 - 92)  BP: 131/70 (18 Jan 2023 13:22) (131/70 - 194/93)  BP(mean): 99 (18 Jan 2023 00:45) (99 - 133)  RR: 17 (18 Jan 2023 13:22) (13 - 20)  SpO2: 98% (18 Jan 2023 13:22) (97% - 100%)    Parameters below as of 18 Jan 2023 13:22  Patient On (Oxygen Delivery Method): room air    abd: mild soft disension  dressings intact  ext: without calf tenderness     ml overnight and 150 ml this AM prior to kitchen removal                          8.8    7.22  )-----------( 182      ( 18 Jan 2023 05:30 )             29.0   01-18    136  |  105  |  15  ----------------------------<  163<H>  5.0   |  21<L>  |  1.16    Ca    8.1<L>      18 Jan 2023 05:30  Phos  4.1     01-18  Mg     1.9     01-18

## 2023-01-18 NOTE — PROGRESS NOTE ADULT - SUBJECTIVE AND OBJECTIVE BOX
Procedure:   Colonoscopy, with polypectomy and biopsy  Laparoscopic right hemicolectomy    Surgeon: Dr. Franekl    S: Pt has no complaints. No nausea or vomiting. Tolerating kitchen well.  Denies CP, SOB, GRADY, calf tenderness. Pain controlled with medication.    O:   T(C): 37 (01-17-23 @ 22:08), Max: 37 (01-17-23 @ 22:08)  T(F): 98.6 (01-17-23 @ 22:08), Max: 98.6 (01-17-23 @ 22:08)  HR: 86 (01-17-23 @ 23:53) (81 - 90)  BP: 156/81 (01-17-23 @ 23:53) (156/81 - 194/93)  RR: 18 (01-17-23 @ 23:53) (15 - 20)  SpO2: 100% (01-17-23 @ 23:53) (97% - 100%)  Wt(kg): --                        10.0   8.24  )-----------( 181      ( 17 Jan 2023 23:22 )             33.5     01-17    135  |  102  |  15  ----------------------------<  203<H>  5.1   |  21<L>  |  1.09    Ca    8.5      17 Jan 2023 23:22  Phos  4.4     01-17  Mg     2.0     01-17        Gen: NAD, resting comfortably in bed,   C/V: NSR, nonbrady nontachy  Eyes; R-eye closed. L-eye PEERLA  Pulm: Nonlabored breathing, no respiratory distress  Abd: Obese. Mod distended. soft, appropriately TTP near incisional site   Incision: C/D/I, Penrose drains  Kitchen; Yellow urine  Extrem: WWP, no calf edema, SCDs in place

## 2023-01-18 NOTE — PATIENT PROFILE ADULT - NSPROPOAPRESSUREINJURY_GEN_A_NUR
Nurse's Notes                                                                                     

 [facility]                                                                 

Name: Jeremy Onofre                                                                            

Age: 62 yrs                                                                                       

Sex: Male                                                                                         

: 1960                                                                                   

MRN: [id number]                                                                                   

Arrival Date: 10/26/2022                                                                          

Time: 20:09                                                                                       

Account#: [id number]                                                                            

Bed 23                                                                                            

Private MD:                                                                                       

Diagnosis: Contusion of left hip;Avascular necrosis left femoral head                             

                                                                                                  

Presentation:                                                                                     

10/26                                                                                             

20:17 Chief complaint: Patient states: I fell hard on my left hip this morning - into the     ld1 

      grass. Denies hitting head, no LOC. Coronavirus screen: At this time, the client does       

      not indicate any symptoms associated with coronavirus-19. Ebola Screen: No symptoms or      

      risks identified at this time. Initial Sepsis Screen: Does the patient meet any 2           

      criteria? No. Patient's initial sepsis screen is negative. Does the patient have a          

      suspected source of infection? No. Patient's initial sepsis screen is negative. Risk        

      Assessment: Do you want to hurt yourself or someone else? Patient reports no desire to      

      harm self or others. Onset of symptoms was 2022.                                

20:17 Method Of Arrival: Ambulatory                                                           ld1 

20:17 Acuity: KIMI 3                                                                           ld1 

                                                                                                  

Triage Assessment:                                                                                

20:19 General: Appears in no apparent distress. uncomfortable, Behavior is calm, cooperative, ld1 

      appropriate for age. Pain: Complains of pain in left hip Pain does not radiate. Pain        

      currently is 9 out of 10 on a pain scale. Quality of pain is described as sharp,            

      shooting, throbbing. EENT: No signs and/or symptoms were reported regarding the EENT        

      system. Neuro: Level of Consciousness is awake, alert, obeys commands, Oriented to          

      person, place, time, situation, Appropriate for age. Cardiovascular: Capillary refill <     

      3 seconds Patient's skin is warm and dry. Respiratory: Airway is patent Respiratory         

      effort is even, unlabored. GI: Abdomen is flat, non-distended. : No signs and/or          

      symptoms were reported regarding the genitourinary system. Derm: No signs and/or            

      symptoms reported regarding the dermatologic system. Musculoskeletal: No signs and/or       

      symptoms reported regarding the musculoskeletal system.                                     

                                                                                                  

Historical:                                                                                       

- Allergies:                                                                                      

20:19 No Known Allergies;                                                                     ld1 

- PMHx:                                                                                           

20:19 Hypertensive disorder;                                                                  ld1 

- PSHx:                                                                                           

20:19 None;                                                                                   ld1 

                                                                                                  

- Immunization history:: Adult Immunizations up to date, Client reports having NOT                

  received the Covid vaccine.                                                                     

- Social history:: Smoking status: Patient reports the use of cigarette tobacco                   

  products, smokes two packs cigarettes per day. Patient uses alcohol, occasionally.              

- Family history:: not pertinent.                                                                 

- Hospitalizations: : No recent hospitalization is reported.                                      

                                                                                                  

                                                                                                  

Screenin:29 Abuse screen: Denies threats or abuse. Denies injuries from another. Nutritional        eh3 

      screening: No deficits noted. Tuberculosis screening: No symptoms or risk factors           

      identified. Fall Risk Fall in past 12 months (25 points). Gait- Impaired (20 pts.).         

      Total Merlos Fall Scale indicates High Risk Score (45 or more points). Fall prevention       

      measures have been instituted. Side Rails Up X 2 Placed Close to Nursing Station            

      Frequent Obs/Assessments Occuring Family Present and informed to notify staff if the        

      need to leave the bedside As available patient and family educated on Fall Prevention       

      Program and Strategies.                                                                     

                                                                                                  

Assessment:                                                                                       

20:29 General: Appears in no apparent distress. uncomfortable. General: Behavior is calm,     eh3 

      cooperative, appropriate for age. Pain: Complains of pain in left hip. Neuro: Level of      

      Consciousness is awake, alert, obeys commands, Oriented to person, place, time,             

      situation. Cardiovascular: Capillary refill < 3 seconds Patient's skin is warm and dry.     

      Respiratory: Airway is patent Respiratory effort is even, unlabored, Respiratory            

      pattern is regular, symmetrical. GI: No signs and/or symptoms were reported involving       

      the gastrointestinal system. : No signs and/or symptoms were reported regarding the       

      genitourinary system. EENT: No signs and/or symptoms were reported regarding the EENT       

      system. Derm: No signs and/or symptoms reported regarding the dermatologic system.          

      Musculoskeletal: Circulation, motion, and sensation intact. Range of motion: limited in     

      left hip.                                                                                   

21:39 Neuro: Perez Agitation-Sedation Scale (RASS): 0 - Alert and Calm.                    eh3 

21:39 Reassessment: Patient and/or family updated on plan of care and expected duration. Pain eh3 

      level reassessed. Patient is alert, oriented x 3, equal unlabored respirations, skin        

      warm/dry/pink.                                                                              

22:19 Neuro: Perez Agitation-Sedation Scale (RASS): 0 - Alert and Calm.                    eh3 

                                                                                                  

Vital Signs:                                                                                      

20:22  / 88; Pulse 78; Resp 18; Temp 98.1(TE); Pulse Ox 98% on R/A; Weight 90.72 kg;    ld1 

      Height 5 ft. 10 in. (177.80 cm); Pain 9/10;                                                 

20:56  / 87; Pulse 76; Resp 16; Pulse Ox 99% on R/A; Pain 10/10;                        eh3 

21:39  / 73; Pulse 93; Resp 16; Pulse Ox 99% on R/A;                                    eh3 

20:22 Body Mass Index 28.70 (90.72 kg, 177.80 cm)                                             ld1 

                                                                                                  

ED Course:                                                                                        

20:09 Patient arrived in ED.                                                                  dt4 

20:09 Eddie Martinez MD is Attending Physician.                                                rn  

20:19 Triage completed.                                                                       ld1 

20:19 Arm band placed on right wrist.                                                         ld1 

20:29 Zoila Guillermo, RN is Primary Nurse.                                                        eh3 

20:29 Patient has correct armband on for positive identification. Bed in low position. Call   eh3 

      light in reach. Side rails up X2. Client placed on continuous cardiac and pulse             

      oximetry monitoring. NIBP monitoring applied.                                               

20:49 XRAY Hip LEFT 2 view In Process Unspecified.                                            EDMS

20:49 XRAY Pelvis In Process Unspecified.                                                     EDMS

20:49 XRAY Lumbar Spine (3 Views) In Process Unspecified.                                     EDMS

22:01 Camacho Ferguson MD is Referral Physician.                                              rn  

22:19 No provider procedures requiring assistance completed. Patient did not have IV access   eh3 

      during this emergency room visit.                                                           

                                                                                                  

Administered Medications:                                                                         

21:39 Drug: Hydrocodone-Acetaminophen (10 mg-500 mg) 1 tabs Route: PO;                        eh3 

22:19 Follow up: Response: No adverse reaction                                                eh3 

                                                                                                  

                                                                                                  

Medication:                                                                                       

22:20 VIS not applicable for this client.                                                     eh3 

                                                                                                  

Outcome:                                                                                          

22:01 Discharge ordered by MD.                                                                rn  

22:19 Discharged to home ambulatory, with family.                                             eh3 

22:19 Condition: stable                                                                           

22:19 Discharge instructions given to patient, family, Instructed on discharge instructions,      

      follow up and referral plans. medication usage, Demonstrated understanding of               

      instructions, follow-up care, medications, Prescriptions given X 1.                         

22:20 Patient left the ED.                                                                    eh3 

                                                                                                  

Signatures:                                                                                       

Dispatcher [employer]                           EDMS                                                 

Eddie Martinez MD MD rn Dibbern, Lauren, RN                     RN   ld1                                                  

Zoila Guillermo RN RN   eh3                                                  

Lutz, Lauren                            dt4                                                  

                                                                                                  

Corrections: (The following items were deleted from the chart)                                    

20:26 20:22 Resp 18bpm; Pulse Ox 98% RA; Temp 98.1F Temporal; 90.72 kg; Height 5 ft. 10 in.;  ld1 

      BMI: 28.7; Pain 9/10; ld1                                                                   

                                                                                                  

************************************************************************************************** no

## 2023-01-18 NOTE — PATIENT PROFILE ADULT - FALL HARM RISK - HARM RISK INTERVENTIONS

## 2023-01-19 LAB
ANION GAP SERPL CALC-SCNC: 13 MMOL/L — SIGNIFICANT CHANGE UP (ref 5–17)
BUN SERPL-MCNC: 16 MG/DL — SIGNIFICANT CHANGE UP (ref 7–23)
CALCIUM SERPL-MCNC: 9.1 MG/DL — SIGNIFICANT CHANGE UP (ref 8.4–10.5)
CHLORIDE SERPL-SCNC: 104 MMOL/L — SIGNIFICANT CHANGE UP (ref 96–108)
CO2 SERPL-SCNC: 21 MMOL/L — LOW (ref 22–31)
CREAT SERPL-MCNC: 1.28 MG/DL — SIGNIFICANT CHANGE UP (ref 0.5–1.3)
EGFR: 56 ML/MIN/1.73M2 — LOW
GLUCOSE BLDC GLUCOMTR-MCNC: 107 MG/DL — HIGH (ref 70–99)
GLUCOSE BLDC GLUCOMTR-MCNC: 95 MG/DL — SIGNIFICANT CHANGE UP (ref 70–99)
GLUCOSE BLDC GLUCOMTR-MCNC: 96 MG/DL — SIGNIFICANT CHANGE UP (ref 70–99)
GLUCOSE BLDC GLUCOMTR-MCNC: 96 MG/DL — SIGNIFICANT CHANGE UP (ref 70–99)
GLUCOSE SERPL-MCNC: 106 MG/DL — HIGH (ref 70–99)
HCT VFR BLD CALC: 31 % — LOW (ref 39–50)
HGB BLD-MCNC: 9.2 G/DL — LOW (ref 13–17)
MAGNESIUM SERPL-MCNC: 2 MG/DL — SIGNIFICANT CHANGE UP (ref 1.6–2.6)
MCHC RBC-ENTMCNC: 25.4 PG — LOW (ref 27–34)
MCHC RBC-ENTMCNC: 29.7 GM/DL — LOW (ref 32–36)
MCV RBC AUTO: 85.6 FL — SIGNIFICANT CHANGE UP (ref 80–100)
NRBC # BLD: 0 /100 WBCS — SIGNIFICANT CHANGE UP (ref 0–0)
PHOSPHATE SERPL-MCNC: 3 MG/DL — SIGNIFICANT CHANGE UP (ref 2.5–4.5)
PLATELET # BLD AUTO: 190 K/UL — SIGNIFICANT CHANGE UP (ref 150–400)
POTASSIUM SERPL-MCNC: 4.7 MMOL/L — SIGNIFICANT CHANGE UP (ref 3.5–5.3)
POTASSIUM SERPL-SCNC: 4.7 MMOL/L — SIGNIFICANT CHANGE UP (ref 3.5–5.3)
RBC # BLD: 3.62 M/UL — LOW (ref 4.2–5.8)
RBC # FLD: 20.3 % — HIGH (ref 10.3–14.5)
SODIUM SERPL-SCNC: 138 MMOL/L — SIGNIFICANT CHANGE UP (ref 135–145)
WBC # BLD: 7.16 K/UL — SIGNIFICANT CHANGE UP (ref 3.8–10.5)
WBC # FLD AUTO: 7.16 K/UL — SIGNIFICANT CHANGE UP (ref 3.8–10.5)

## 2023-01-19 RX ORDER — LABETALOL HCL 100 MG
10 TABLET ORAL ONCE
Refills: 0 | Status: COMPLETED | OUTPATIENT
Start: 2023-01-19 | End: 2023-01-19

## 2023-01-19 RX ORDER — LISINOPRIL 2.5 MG/1
10 TABLET ORAL EVERY 24 HOURS
Refills: 0 | Status: DISCONTINUED | OUTPATIENT
Start: 2023-01-19 | End: 2023-01-22

## 2023-01-19 RX ADMIN — Medication 15 MILLIGRAM(S): at 05:37

## 2023-01-19 RX ADMIN — Medication 15 MILLIGRAM(S): at 12:23

## 2023-01-19 RX ADMIN — ATORVASTATIN CALCIUM 20 MILLIGRAM(S): 80 TABLET, FILM COATED ORAL at 12:23

## 2023-01-19 RX ADMIN — LISINOPRIL 10 MILLIGRAM(S): 2.5 TABLET ORAL at 06:44

## 2023-01-19 RX ADMIN — HEPARIN SODIUM 5000 UNIT(S): 5000 INJECTION INTRAVENOUS; SUBCUTANEOUS at 18:01

## 2023-01-19 RX ADMIN — HEPARIN SODIUM 5000 UNIT(S): 5000 INJECTION INTRAVENOUS; SUBCUTANEOUS at 03:20

## 2023-01-19 RX ADMIN — Medication 10 MILLIGRAM(S): at 05:38

## 2023-01-19 RX ADMIN — Medication 10 MILLIGRAM(S): at 14:11

## 2023-01-19 RX ADMIN — HEPARIN SODIUM 5000 UNIT(S): 5000 INJECTION INTRAVENOUS; SUBCUTANEOUS at 12:23

## 2023-01-19 NOTE — PROGRESS NOTE ADULT - ASSESSMENT
a/p POD2 Lap right hemicolectomy for large polyps.  Acute urinary retention overnight. Passing flatus , no bm yet.  Labs - hct stable, lytes wnl  1. May need another straight cath if unable to void  2. AMbulate 3. Incentive spirometry 4. Consider LRD

## 2023-01-19 NOTE — PROGRESS NOTE ADULT - SUBJECTIVE AND OBJECTIVE BOX
POD 2  9 Uris    Patient has not been able to void adequately and has been st cathed x 2 as of 3 PM (800 ml and 750 ml obtained).  Voided small amounts since as per last communication with team.  Has passed gas and is tolerating moderate volumes of clear liquuds.  Been ambulating well in hallway.  ZULEYKA Ocasio changed his venous stasis dressing today  Patient not c/o pain.    Vital Signs Last 24 Hrs  T(C): 36.8 (19 Jan 2023 16:20), Max: 37 (19 Jan 2023 13:09)  T(F): 98.3 (19 Jan 2023 16:20), Max: 98.6 (19 Jan 2023 13:09)  HR: 86 (19 Jan 2023 16:20) (83 - 101)  BP: 171/75 (19 Jan 2023 16:20) (149/60 - 191/83)  BP(mean): --  RR: 18 (19 Jan 2023 16:20) (17 - 18)  SpO2: 100% (19 Jan 2023 16:20) (99% - 100%)    Parameters below as of 19 Jan 2023 16:20  Patient On (Oxygen Delivery Method): room air    lungs clear but with decreased BS right base   abd: incisions intact, dressing changed by HO's  BS+, softly distended, no rebound or guarding  ext: R leg venous stasis ulcer the same but with new dressing.    UO 1550 via cath and 150 ml via void today                          9.2    7.16  )-----------( 190      ( 19 Jan 2023 05:30 )             31.0   01-19    138  |  104  |  16  ----------------------------<  106<H>  4.7   |  21<L>  |  1.28    Ca    9.1      19 Jan 2023 05:30  Phos  3.0     01-19  Mg     2.0     01-19

## 2023-01-19 NOTE — ADVANCED PRACTICE NURSE CONSULT - ASSESSMENT
81yo male with a PMhx significant for DVT (R leg, 3-4 months ago per pt), renal dysfunction, HLD, HTN, DM2, CAD, colon polps, PSHx of CABG (2009; on Plavix and Eliquis), gastrectomy for PUD (1968), corneal transplant, inguinal hernia repair. Recent colonscopy significant for 2 cm transverse colon and 35 mm sessile cecal polp. S/p colonoscopy  w. polypectomy and biopsy converted to open right hemicolectomy (1/17). Pt with open wound to lateral aspect of right calf measuring approx 2 x 2 cm, dermis visible. Lower right leg with +2 edema, minimal at left, pulses by Doppler. Pt denies any trauma to lower leg.

## 2023-01-19 NOTE — PROGRESS NOTE ADULT - ASSESSMENT
A/P  Doing OK GI wise and ambulation wise.  Presumed BPH is reason for retention.  If requires 3rd st cath then will leave kitchen in and send home with left bag and get  consult.  Clears unlimited.  Await BM.  Continue ambulation.  Spirometry and cough for lungs

## 2023-01-19 NOTE — ADVANCED PRACTICE NURSE CONSULT - RECOMMEDATIONS
Recommend hydrogel to site, protect with CAvilon barrier wipes, cover with dry dressing and wrap with ryan and ace wrap. Spoke with ALIX Perez and house staff.

## 2023-01-19 NOTE — PROGRESS NOTE ADULT - SUBJECTIVE AND OBJECTIVE BOX
s/p Lap Right hemicolectomy for large polyps  URinary rentetion at midnight, straight cath 800cc.  Has Hx of difficulty urinating at home.    PAssing flatus. No BM. No n /v    Subjective:     MEDICATIONS  (STANDING):  atorvastatin 20 milliGRAM(s) Oral daily  dextrose 5%. 1000 milliLiter(s) (100 mL/Hr) IV Continuous <Continuous>  dextrose 5%. 1000 milliLiter(s) (50 mL/Hr) IV Continuous <Continuous>  dextrose 50% Injectable 25 Gram(s) IV Push once  dextrose 50% Injectable 12.5 Gram(s) IV Push once  dextrose 50% Injectable 25 Gram(s) IV Push once  Fluorometholone acetate 0.1% (Flarex) 1 Drop(s) 1 Drop(s) Left EYE three times a day  glucagon  Injectable 1 milliGRAM(s) IntraMuscular once  heparin   Injectable 5000 Unit(s) SubCutaneous every 8 hours  insulin lispro (ADMELOG) corrective regimen sliding scale   SubCutaneous Before meals and at bedtime  ketorolac   Injectable 15 milliGRAM(s) IV Push every 6 hours  lactated ringers. 1000 milliLiter(s) (40 mL/Hr) IV Continuous <Continuous>  lisinopril 10 milliGRAM(s) Oral every 24 hours  Netarsudil 0.02% (Rhopressa) 1 Drop(s) 1 Drop(s) Left EYE at bedtime    MEDICATIONS  (PRN):  dextrose Oral Gel 15 Gram(s) Oral once PRN Blood Glucose LESS THAN 70 milliGRAM(s)/deciliter  oxyCODONE    IR 5 milliGRAM(s) Oral every 4 hours PRN Moderate Pain (4 - 6)  oxyCODONE    IR 10 milliGRAM(s) Oral every 4 hours PRN Severe Pain (7 - 10)      Vital Signs Last 24 Hrs  T(C): 36.7 (19 Jan 2023 05:05), Max: 36.9 (18 Jan 2023 20:15)  T(F): 98.1 (19 Jan 2023 05:05), Max: 98.4 (18 Jan 2023 20:15)  HR: 86 (19 Jan 2023 06:00) (84 - 101)  BP: 162/75 (19 Jan 2023 06:00) (131/70 - 191/83)  BP(mean): --  RR: 18 (19 Jan 2023 05:05) (17 - 18)  SpO2: 100% (19 Jan 2023 05:05) (98% - 100%)    Parameters below as of 19 Jan 2023 05:05  Patient On (Oxygen Delivery Method): room air        Physical Exam  General: NAD, resting comfortably in bed  Pulm: Nonlabored breathing, no respiratory distress  Abd: soft, non distended, incisions c/d/i, non tender  Extrem: WWP, no edema,  Neuro: A/O x 3, CNs II-XII grossly intact, no focal deficits, normal sensation  Pulses: palpable distal pulses     I&O's Summary    18 Jan 2023 07:01  -  19 Jan 2023 07:00  --------------------------------------------------------  IN: 1020 mL / OUT: 1225 mL / NET: -205 mL                              9.2    7.16  )-----------( 190      ( 19 Jan 2023 05:30 )             31.0       01-19    138  |  104  |  16  ----------------------------<  106<H>  4.7   |  21<L>  |  1.28    Ca    9.1      19 Jan 2023 05:30  Phos  3.0     01-19  Mg     2.0     01-19        Micro:    Imaging:

## 2023-01-19 NOTE — PROGRESS NOTE ADULT - ASSESSMENT
81yo male with a PMhx significant for DVT (R leg, 3-4 months ago per pt), renal dysfunction, HLD, HTN, DM2, CAD, colon polps, PSHx of CABG (2009; on Plavix and Eliquis), gastrectomy for PUD (1968), corneal transplant, inguinal hernia repair. Recent colonscopy significant for 2 cm transverse colon and 35 mm sessile cecal polp. S/p colonoscopy  w. polypectomy and biopsy converted to open right hemicolectomy (1/17).     CLD/IVF  F/u TOV  Pain/nausea control  Home lisinopril and eye drops  SCD (LEFT SIDE ONLY)/IS/SQH (q12)  ISS  Penrose drains   PT: Home with no PT

## 2023-01-19 NOTE — PROGRESS NOTE ADULT - SUBJECTIVE AND OBJECTIVE BOX
INTERVAL HPI/OVERNIGHT EVENTS: ON: voided total 200cc by 8pm, c/o unable to void completely, , straight cath @0030 w/ 800cc out, pending new TOV, SBP 190s labatolol 10 x1,     STATUS POST: 1/17: colonoscopy w. polypectomy and biopsy, converted to open right hemicolectomy     POST OPERATIVE DAY #: 2    SUBJECTIVE: Patient seen and examined this morning by the surgery team. He reports having mild abdominal pain, but improved from yesterday. Tolerating clear liquid diet. +F/-BM. Denies cp, sob, nausea, emesis, or fevers.     MEDICATIONS  (STANDING):  atorvastatin 20 milliGRAM(s) Oral daily  dextrose 5%. 1000 milliLiter(s) (100 mL/Hr) IV Continuous <Continuous>  dextrose 5%. 1000 milliLiter(s) (50 mL/Hr) IV Continuous <Continuous>  dextrose 50% Injectable 25 Gram(s) IV Push once  dextrose 50% Injectable 12.5 Gram(s) IV Push once  dextrose 50% Injectable 25 Gram(s) IV Push once  Fluorometholone acetate 0.1% (Flarex) 1 Drop(s) 1 Drop(s) Left EYE three times a day  glucagon  Injectable 1 milliGRAM(s) IntraMuscular once  heparin   Injectable 5000 Unit(s) SubCutaneous every 8 hours  insulin lispro (ADMELOG) corrective regimen sliding scale   SubCutaneous Before meals and at bedtime  ketorolac   Injectable 15 milliGRAM(s) IV Push every 6 hours  lactated ringers. 1000 milliLiter(s) (40 mL/Hr) IV Continuous <Continuous>  lisinopril 10 milliGRAM(s) Oral every 24 hours  Netarsudil 0.02% (Rhopressa) 1 Drop(s) 1 Drop(s) Left EYE at bedtime    MEDICATIONS  (PRN):  dextrose Oral Gel 15 Gram(s) Oral once PRN Blood Glucose LESS THAN 70 milliGRAM(s)/deciliter  oxyCODONE    IR 5 milliGRAM(s) Oral every 4 hours PRN Moderate Pain (4 - 6)  oxyCODONE    IR 10 milliGRAM(s) Oral every 4 hours PRN Severe Pain (7 - 10)      Vital Signs Last 24 Hrs  T(C): 36.7 (19 Jan 2023 05:05), Max: 36.9 (18 Jan 2023 20:15)  T(F): 98.1 (19 Jan 2023 05:05), Max: 98.4 (18 Jan 2023 20:15)  HR: 86 (19 Jan 2023 06:00) (84 - 101)  BP: 162/75 (19 Jan 2023 06:00) (131/70 - 191/83)  BP(mean): --  RR: 18 (19 Jan 2023 05:05) (17 - 18)  SpO2: 100% (19 Jan 2023 05:05) (98% - 100%)    Parameters below as of 19 Jan 2023 05:05  Patient On (Oxygen Delivery Method): room air        PHYSICAL EXAM:  Constitutional: A&Ox3  Respiratory: non labored breathing, no respiratory distress  Cardiovascular: NSR, RRR  Gastrointestinal: Abdomen soft, appropriately tender and non-distended                  Incision: c/d/i, penrose drains visible.   Extremities: wwp, no calf edema or tenderness, SCD on left leg only due to DVT hx on the right      I&O's Detail    18 Jan 2023 07:01  -  19 Jan 2023 07:00  --------------------------------------------------------  IN:    Lactated Ringers: 400 mL    Lactated Ringers Bolus: 500 mL    Oral Fluid: 120 mL  Total IN: 1020 mL    OUT:    Indwelling Catheter - Urethral (mL): 150 mL    Intermittent Catheterization - Urethral (mL): 800 mL    Voided (mL): 275 mL  Total OUT: 1225 mL    Total NET: -205 mL      LABS:                        9.2    7.16  )-----------( 190      ( 19 Jan 2023 05:30 )             31.0     01-19    138  |  104  |  16  ----------------------------<  106<H>  4.7   |  21<L>  |  1.28    Ca    9.1      19 Jan 2023 05:30  Phos  3.0     01-19  Mg     2.0     01-19            RADIOLOGY & ADDITIONAL STUDIES:

## 2023-01-20 LAB
ANION GAP SERPL CALC-SCNC: 14 MMOL/L — SIGNIFICANT CHANGE UP (ref 5–17)
APPEARANCE UR: CLEAR — SIGNIFICANT CHANGE UP
BACTERIA # UR AUTO: SIGNIFICANT CHANGE UP /HPF
BILIRUB UR-MCNC: NEGATIVE — SIGNIFICANT CHANGE UP
BUN SERPL-MCNC: 11 MG/DL — SIGNIFICANT CHANGE UP (ref 7–23)
CALCIUM SERPL-MCNC: 8.7 MG/DL — SIGNIFICANT CHANGE UP (ref 8.4–10.5)
CHLORIDE SERPL-SCNC: 106 MMOL/L — SIGNIFICANT CHANGE UP (ref 96–108)
CO2 SERPL-SCNC: 20 MMOL/L — LOW (ref 22–31)
COLOR SPEC: YELLOW — SIGNIFICANT CHANGE UP
CREAT SERPL-MCNC: 1.02 MG/DL — SIGNIFICANT CHANGE UP (ref 0.5–1.3)
DIFF PNL FLD: ABNORMAL
EGFR: 74 ML/MIN/1.73M2 — SIGNIFICANT CHANGE UP
EPI CELLS # UR: SIGNIFICANT CHANGE UP /HPF (ref 0–5)
GLUCOSE BLDC GLUCOMTR-MCNC: 105 MG/DL — HIGH (ref 70–99)
GLUCOSE BLDC GLUCOMTR-MCNC: 136 MG/DL — HIGH (ref 70–99)
GLUCOSE BLDC GLUCOMTR-MCNC: 147 MG/DL — HIGH (ref 70–99)
GLUCOSE BLDC GLUCOMTR-MCNC: 165 MG/DL — HIGH (ref 70–99)
GLUCOSE BLDC GLUCOMTR-MCNC: 80 MG/DL — SIGNIFICANT CHANGE UP (ref 70–99)
GLUCOSE SERPL-MCNC: 69 MG/DL — LOW (ref 70–99)
GLUCOSE UR QL: NEGATIVE — SIGNIFICANT CHANGE UP
HCT VFR BLD CALC: 25.2 % — LOW (ref 39–50)
HCT VFR BLD CALC: 30 % — LOW (ref 39–50)
HGB BLD-MCNC: 7.7 G/DL — LOW (ref 13–17)
HGB BLD-MCNC: 8.7 G/DL — LOW (ref 13–17)
KETONES UR-MCNC: >=80 MG/DL
LEUKOCYTE ESTERASE UR-ACNC: NEGATIVE — SIGNIFICANT CHANGE UP
MAGNESIUM SERPL-MCNC: 1.9 MG/DL — SIGNIFICANT CHANGE UP (ref 1.6–2.6)
MCHC RBC-ENTMCNC: 25.8 PG — LOW (ref 27–34)
MCHC RBC-ENTMCNC: 26.1 PG — LOW (ref 27–34)
MCHC RBC-ENTMCNC: 29 GM/DL — LOW (ref 32–36)
MCHC RBC-ENTMCNC: 30.6 GM/DL — LOW (ref 32–36)
MCV RBC AUTO: 85.4 FL — SIGNIFICANT CHANGE UP (ref 80–100)
MCV RBC AUTO: 89 FL — SIGNIFICANT CHANGE UP (ref 80–100)
NITRITE UR-MCNC: NEGATIVE — SIGNIFICANT CHANGE UP
NRBC # BLD: 0 /100 WBCS — SIGNIFICANT CHANGE UP (ref 0–0)
NRBC # BLD: 0 /100 WBCS — SIGNIFICANT CHANGE UP (ref 0–0)
PH UR: 6 — SIGNIFICANT CHANGE UP (ref 5–8)
PHOSPHATE SERPL-MCNC: 3.1 MG/DL — SIGNIFICANT CHANGE UP (ref 2.5–4.5)
PLATELET # BLD AUTO: 173 K/UL — SIGNIFICANT CHANGE UP (ref 150–400)
PLATELET # BLD AUTO: 175 K/UL — SIGNIFICANT CHANGE UP (ref 150–400)
POTASSIUM SERPL-MCNC: 4 MMOL/L — SIGNIFICANT CHANGE UP (ref 3.5–5.3)
POTASSIUM SERPL-SCNC: 4 MMOL/L — SIGNIFICANT CHANGE UP (ref 3.5–5.3)
PROT UR-MCNC: 30 MG/DL
RBC # BLD: 2.95 M/UL — LOW (ref 4.2–5.8)
RBC # BLD: 3.37 M/UL — LOW (ref 4.2–5.8)
RBC # FLD: 20.7 % — HIGH (ref 10.3–14.5)
RBC # FLD: 20.9 % — HIGH (ref 10.3–14.5)
RBC CASTS # UR COMP ASSIST: ABNORMAL /HPF
SODIUM SERPL-SCNC: 140 MMOL/L — SIGNIFICANT CHANGE UP (ref 135–145)
SP GR SPEC: >=1.03 — SIGNIFICANT CHANGE UP (ref 1–1.03)
UROBILINOGEN FLD QL: 0.2 E.U./DL — SIGNIFICANT CHANGE UP
WBC # BLD: 5.78 K/UL — SIGNIFICANT CHANGE UP (ref 3.8–10.5)
WBC # BLD: 9.09 K/UL — SIGNIFICANT CHANGE UP (ref 3.8–10.5)
WBC # FLD AUTO: 5.78 K/UL — SIGNIFICANT CHANGE UP (ref 3.8–10.5)
WBC # FLD AUTO: 9.09 K/UL — SIGNIFICANT CHANGE UP (ref 3.8–10.5)
WBC UR QL: < 5 /HPF — SIGNIFICANT CHANGE UP

## 2023-01-20 RX ORDER — TAMSULOSIN HYDROCHLORIDE 0.4 MG/1
0.4 CAPSULE ORAL DAILY
Refills: 0 | Status: DISCONTINUED | OUTPATIENT
Start: 2023-01-20 | End: 2023-01-22

## 2023-01-20 RX ORDER — APIXABAN 2.5 MG/1
2.5 TABLET, FILM COATED ORAL EVERY 12 HOURS
Refills: 0 | Status: DISCONTINUED | OUTPATIENT
Start: 2023-01-20 | End: 2023-01-22

## 2023-01-20 RX ORDER — LABETALOL HCL 100 MG
10 TABLET ORAL ONCE
Refills: 0 | Status: COMPLETED | OUTPATIENT
Start: 2023-01-20 | End: 2023-01-20

## 2023-01-20 RX ORDER — ACETAMINOPHEN 500 MG
1000 TABLET ORAL ONCE
Refills: 0 | Status: COMPLETED | OUTPATIENT
Start: 2023-01-20 | End: 2023-01-20

## 2023-01-20 RX ORDER — ACETAMINOPHEN 500 MG
1000 TABLET ORAL EVERY 6 HOURS
Refills: 0 | Status: DISCONTINUED | OUTPATIENT
Start: 2023-01-20 | End: 2023-01-22

## 2023-01-20 RX ADMIN — LISINOPRIL 10 MILLIGRAM(S): 2.5 TABLET ORAL at 05:20

## 2023-01-20 RX ADMIN — APIXABAN 2.5 MILLIGRAM(S): 2.5 TABLET, FILM COATED ORAL at 17:56

## 2023-01-20 RX ADMIN — HEPARIN SODIUM 5000 UNIT(S): 5000 INJECTION INTRAVENOUS; SUBCUTANEOUS at 11:17

## 2023-01-20 RX ADMIN — TAMSULOSIN HYDROCHLORIDE 0.4 MILLIGRAM(S): 0.4 CAPSULE ORAL at 09:15

## 2023-01-20 RX ADMIN — Medication 10 MILLIGRAM(S): at 09:15

## 2023-01-20 RX ADMIN — Medication 1000 MILLIGRAM(S): at 21:00

## 2023-01-20 RX ADMIN — Medication 10 MILLIGRAM(S): at 20:59

## 2023-01-20 RX ADMIN — Medication 10 MILLIGRAM(S): at 00:36

## 2023-01-20 RX ADMIN — Medication 1000 MILLIGRAM(S): at 13:41

## 2023-01-20 RX ADMIN — Medication 1000 MILLIGRAM(S): at 14:41

## 2023-01-20 RX ADMIN — HEPARIN SODIUM 5000 UNIT(S): 5000 INJECTION INTRAVENOUS; SUBCUTANEOUS at 03:08

## 2023-01-20 RX ADMIN — ATORVASTATIN CALCIUM 20 MILLIGRAM(S): 80 TABLET, FILM COATED ORAL at 22:51

## 2023-01-20 NOTE — PROGRESS NOTE ADULT - ASSESSMENT
A/P  Await BM  Requesting urology consult for retention  will start tamsulosin po for BPH  Leave kitchen in.  PLan to send home with leg bag.  Adjust BP meds as needed.  Hgb drop noted.  Will recheck Hgb later today.  Doubt he is bleeding.  Holding on eliquis and plavix for now.  VNS if possible.  hopefully d/c over weekend.  He is in 80's and lives alone.  Concerned about his home situation.   visit good idea

## 2023-01-20 NOTE — PROGRESS NOTE ADULT - SUBJECTIVE AND OBJECTIVE BOX
POD 3   9 Uris    Patient advanced to low residue diet this am>  Been tolerating clear liquids.  Failed void trial and now has kitchen back in place  Ambulating very well in hallway independently.  Passing flatus but no BM yet.    Vital Signs Last 24 Hrs  T(C): 36.7 (20 Jan 2023 08:22), Max: 37.1 (19 Jan 2023 20:20)  T(F): 98.1 (20 Jan 2023 08:22), Max: 98.8 (19 Jan 2023 20:20)  HR: 86 (20 Jan 2023 10:00) (79 - 95)  BP: 158/65 (20 Jan 2023 10:00) (149/60 - 192/80)  BP(mean): --  RR: 16 (20 Jan 2023 08:22) (16 - 18)  SpO2: 99% (20 Jan 2023 08:22) (98% - 100%)    Parameters below as of 20 Jan 2023 08:22  Patient On (Oxygen Delivery Method): room air    lungs decreased BS at bases  abd: soft, not significantly distended  incisions intact, dressings clean  ext: without change    UO 2200 ml yesterday    IV saline locked as of this am.                          7.7    5.78  )-----------( 173      ( 20 Jan 2023 07:30 )             25.2   01-20    140  |  106  |  11  ----------------------------<  69<L>  4.0   |  20<L>  |  1.02    Ca    8.7      20 Jan 2023 07:30  Phos  3.1     01-20  Mg     1.9     01-20

## 2023-01-20 NOTE — PROGRESS NOTE ADULT - ASSESSMENT
81yo male with a PMhx significant for DVT (R leg, 3-4 months ago per pt), renal dysfunction, HLD, HTN, DM2, CAD, colon polps, PSHx of CABG (2009; on Plavix and Eliquis), gastrectomy for PUD (1968), corneal transplant, inguinal hernia repair. Recent colonscopy significant for 2 cm transverse colon and 35 mm sessile cecal polp. S/p colonoscopy  w. polypectomy and biopsy converted to open right hemicolectomy (1/17).     Plan:  LRD/IVF  [place], f/u uro recs  Pain/nausea control  Home lisinopril and eye drops  Restart home eliquis, holding plavix until tomorrow 1/21  SCD (LEFT SIDE ONLY)/IS/SQH (q12)  ISS  Penrose drains   PT: Home with no PT

## 2023-01-20 NOTE — PROGRESS NOTE ADULT - SUBJECTIVE AND OBJECTIVE BOX
INTERVAL HPI/OVERNIGHT EVENTS:  Overnight, +f/-bm, john CLD, , labatolol 10 x1, improved , adv diet to LRD per dr neal. Patient seen and examined by chief resident and team on AM rounds. Patient has been tolerating liquids as of this morning, with f/u after advancing. -n/-v/+f/-bm.     STATUS POST: 1/17: colonoscopy w. polypectomy and biopsy, converted to open right hemicolectomy     POST OPERATIVE DAY #: 3     SUBJECTIVE:  MEDICATIONS  (STANDING):  atorvastatin 20 milliGRAM(s) Oral daily  dextrose 5%. 1000 milliLiter(s) (100 mL/Hr) IV Continuous <Continuous>  dextrose 5%. 1000 milliLiter(s) (50 mL/Hr) IV Continuous <Continuous>  dextrose 50% Injectable 25 Gram(s) IV Push once  dextrose 50% Injectable 12.5 Gram(s) IV Push once  dextrose 50% Injectable 25 Gram(s) IV Push once  Fluorometholone acetate 0.1% (Flarex) 1 Drop(s) 1 Drop(s) Left EYE three times a day  glucagon  Injectable 1 milliGRAM(s) IntraMuscular once  heparin   Injectable 5000 Unit(s) SubCutaneous every 8 hours  insulin lispro (ADMELOG) corrective regimen sliding scale   SubCutaneous Before meals and at bedtime  lisinopril 10 milliGRAM(s) Oral every 24 hours  Netarsudil 0.02% (Rhopressa) 1 Drop(s) 1 Drop(s) Left EYE at bedtime  tamsulosin 0.4 milliGRAM(s) Oral daily    MEDICATIONS  (PRN):  dextrose Oral Gel 15 Gram(s) Oral once PRN Blood Glucose LESS THAN 70 milliGRAM(s)/deciliter  oxyCODONE    IR 5 milliGRAM(s) Oral every 4 hours PRN Moderate Pain (4 - 6)  oxyCODONE    IR 10 milliGRAM(s) Oral every 4 hours PRN Severe Pain (7 - 10)      Vital Signs Last 24 Hrs  T(C): 36.7 (20 Jan 2023 08:22), Max: 37.1 (19 Jan 2023 20:20)  T(F): 98.1 (20 Jan 2023 08:22), Max: 98.8 (19 Jan 2023 20:20)  HR: 79 (20 Jan 2023 09:32) (79 - 95)  BP: 165/74 (20 Jan 2023 09:32) (149/60 - 192/80)  BP(mean): --  RR: 16 (20 Jan 2023 08:22) (16 - 18)  SpO2: 99% (20 Jan 2023 08:22) (98% - 100%)    Parameters below as of 20 Jan 2023 08:22  Patient On (Oxygen Delivery Method): room air        PHYSICAL EXAM:  Constitutional: NAD  Respiratory: non labored breathing, no respiratory distress  Cardiovascular: RRR  Gastrointestinal: soft, ND, minimal and appropriate tendernes. Incisions c/d/i, penrose drains in place.   Extremities: wwp, no calf edema or tenderness, SCD on left leg only due to DVT hx on the right      I&O's Detail    19 Jan 2023 07:01  -  20 Jan 2023 07:00  --------------------------------------------------------  IN:    Lactated Ringers: 240 mL    Oral Fluid: 650 mL  Total IN: 890 mL    OUT:    Indwelling Catheter - Urethral (mL): 2200 mL    Intermittent Catheterization - Urethral (mL): 800 mL    Voided (mL): 150 mL  Total OUT: 3150 mL    Total NET: -2260 mL          LABS:                        7.7    5.78  )-----------( 173      ( 20 Jan 2023 07:30 )             25.2     01-20    140  |  106  |  11  ----------------------------<  69<L>  4.0   |  20<L>  |  1.02    Ca    8.7      20 Jan 2023 07:30  Phos  3.1     01-20  Mg     1.9     01-20            RADIOLOGY & ADDITIONAL STUDIES:

## 2023-01-20 NOTE — CONSULT NOTE ADULT - ASSESSMENT
Patient is a 82M w/ urinary retention likely 2/2 to BPH.  Patient has kitchen catheter draining yellow/clear output. Due to failing multiple TOV's w/ elevated PVRs to 750cc, he will require kitchen catheter on discharge, w/ f/u to Urology clinic for TOV.  Patient should continue his Flomax.      Recs:  -Due to elevated PVR's patient should be d/c w/ Kitchen catheter.  -F/u Urology Clinic approx 1 week post discharge, for TOV.       - Clinic: [address] [place] [address] [address].  [phone].  -Please send UA and Urine Culture.

## 2023-01-20 NOTE — CONSULT NOTE ADULT - SUBJECTIVE AND OBJECTIVE BOX
Patient is a 82y old  Male who presents with a chief complaint of Elective admission for   endoscopic removal of polyps +/- Lap Right colectomy. (20 Jan 2023 09:49)      HPI:  82 year old on plavix and eliquis who was found to have 2 cm transverse colon and 35 mm sessile cecal polyp on recently colonoscopy. Bx not done because of anticoagulation and anti platelet agents. Sent for evaluation.  Patient is asymptomatic but tends to be bit constipated at times.    He is active and ambulates out of house frequently. Can walk 3 blocks.       PSH  CABG 2009  Gastrectomy 1968 for PUD  Corneal transplant  Other eye surgery  inguinal hernia repair long time ago    PMH  DVT last year    Med  Elqiuis  plavix  metformin  trulicity weekly  lisinopril  metoprolol  lasix  statin    Allergies: none   (16 Jan 2023 22:19)     <<<<<<  Consult Note >>>>>>>  Patient is as stated above,  team consulted for urinary retention s/p failed TOV x 3 times.  Patient has been able to urinate minutely w/ PVR of approx 500cc, 750cc.  Patient had 14fr kitchen catheter placed by primary team w/ 900cc output. Patient states last time saw a Urologist was approx, 10 years ago due to LUT's.  Patient endorses nocturia x 3 times a night, feeling of incomplete emptying, intermittent dysuria, intermittent urine stream w/ dribbling, urge to void but not output.  Patient is on Flomax.  Patient denies n/v/f/c, hematuria, discharge from meatus, abdominal pain, chest pain, SOB.         Vital Signs Last 24 Hrs  T(C): 36.7 (20 Jan 2023 08:22), Max: 37.1 (19 Jan 2023 20:20)  T(F): 98.1 (20 Jan 2023 08:22), Max: 98.8 (19 Jan 2023 20:20)  HR: 86 (20 Jan 2023 10:00) (79 - 95)  BP: 158/65 (20 Jan 2023 10:00) (149/60 - 192/80)  BP(mean): --  RR: 16 (20 Jan 2023 08:22) (16 - 18)  SpO2: 99% (20 Jan 2023 08:22) (98% - 100%)    Parameters below as of 20 Jan 2023 08:22  Patient On (Oxygen Delivery Method): room air      I&O's Summary    19 Jan 2023 07:01  -  20 Jan 2023 07:00  --------------------------------------------------------  IN: 890 mL / OUT: 3150 mL / NET: -2260 mL        PE:  Gen: NAD, alert and oriented x 3   Abd: appropriately soft nt/nd. Incision site C/d/I. Negative CVAT B/L  : Kitchen catheter in place attached to leg bag, w/ yellow/clear output.  CATARINO: Deferred.     LABS:                        7.7    5.78  )-----------( 173      ( 20 Jan 2023 07:30 )             25.2     01-20    140  |  106  |  11  ----------------------------<  69<L>  4.0   |  20<L>  |  1.02    Ca    8.7      20 Jan 2023 07:30  Phos  3.1     01-20  Mg     1.9     01-20

## 2023-01-21 ENCOUNTER — TRANSCRIPTION ENCOUNTER (OUTPATIENT)
Age: 83
End: 2023-01-21

## 2023-01-21 LAB
ANION GAP SERPL CALC-SCNC: 14 MMOL/L — SIGNIFICANT CHANGE UP (ref 5–17)
BUN SERPL-MCNC: 12 MG/DL — SIGNIFICANT CHANGE UP (ref 7–23)
CALCIUM SERPL-MCNC: 9 MG/DL — SIGNIFICANT CHANGE UP (ref 8.4–10.5)
CHLORIDE SERPL-SCNC: 106 MMOL/L — SIGNIFICANT CHANGE UP (ref 96–108)
CO2 SERPL-SCNC: 21 MMOL/L — LOW (ref 22–31)
CREAT SERPL-MCNC: 0.97 MG/DL — SIGNIFICANT CHANGE UP (ref 0.5–1.3)
EGFR: 78 ML/MIN/1.73M2 — SIGNIFICANT CHANGE UP
GLUCOSE BLDC GLUCOMTR-MCNC: 138 MG/DL — HIGH (ref 70–99)
GLUCOSE BLDC GLUCOMTR-MCNC: 150 MG/DL — HIGH (ref 70–99)
GLUCOSE BLDC GLUCOMTR-MCNC: 152 MG/DL — HIGH (ref 70–99)
GLUCOSE BLDC GLUCOMTR-MCNC: 84 MG/DL — SIGNIFICANT CHANGE UP (ref 70–99)
GLUCOSE SERPL-MCNC: 88 MG/DL — SIGNIFICANT CHANGE UP (ref 70–99)
HCT VFR BLD CALC: 26.2 % — LOW (ref 39–50)
HCT VFR BLD CALC: 30.9 % — LOW (ref 39–50)
HGB BLD-MCNC: 7.9 G/DL — LOW (ref 13–17)
HGB BLD-MCNC: 9.3 G/DL — LOW (ref 13–17)
MAGNESIUM SERPL-MCNC: 1.8 MG/DL — SIGNIFICANT CHANGE UP (ref 1.6–2.6)
MCHC RBC-ENTMCNC: 25.6 PG — LOW (ref 27–34)
MCHC RBC-ENTMCNC: 25.8 PG — LOW (ref 27–34)
MCHC RBC-ENTMCNC: 30.1 GM/DL — LOW (ref 32–36)
MCHC RBC-ENTMCNC: 30.2 GM/DL — LOW (ref 32–36)
MCV RBC AUTO: 84.8 FL — SIGNIFICANT CHANGE UP (ref 80–100)
MCV RBC AUTO: 85.8 FL — SIGNIFICANT CHANGE UP (ref 80–100)
NRBC # BLD: 0 /100 WBCS — SIGNIFICANT CHANGE UP (ref 0–0)
NRBC # BLD: 0 /100 WBCS — SIGNIFICANT CHANGE UP (ref 0–0)
PHOSPHATE SERPL-MCNC: 3 MG/DL — SIGNIFICANT CHANGE UP (ref 2.5–4.5)
PLATELET # BLD AUTO: 186 K/UL — SIGNIFICANT CHANGE UP (ref 150–400)
PLATELET # BLD AUTO: 188 K/UL — SIGNIFICANT CHANGE UP (ref 150–400)
POTASSIUM SERPL-MCNC: 3.7 MMOL/L — SIGNIFICANT CHANGE UP (ref 3.5–5.3)
POTASSIUM SERPL-SCNC: 3.7 MMOL/L — SIGNIFICANT CHANGE UP (ref 3.5–5.3)
RBC # BLD: 3.09 M/UL — LOW (ref 4.2–5.8)
RBC # BLD: 3.6 M/UL — LOW (ref 4.2–5.8)
RBC # FLD: 20.7 % — HIGH (ref 10.3–14.5)
RBC # FLD: 20.8 % — HIGH (ref 10.3–14.5)
SODIUM SERPL-SCNC: 141 MMOL/L — SIGNIFICANT CHANGE UP (ref 135–145)
WBC # BLD: 5.24 K/UL — SIGNIFICANT CHANGE UP (ref 3.8–10.5)
WBC # BLD: 6.88 K/UL — SIGNIFICANT CHANGE UP (ref 3.8–10.5)
WBC # FLD AUTO: 5.24 K/UL — SIGNIFICANT CHANGE UP (ref 3.8–10.5)
WBC # FLD AUTO: 6.88 K/UL — SIGNIFICANT CHANGE UP (ref 3.8–10.5)

## 2023-01-21 RX ORDER — LABETALOL HCL 100 MG
10 TABLET ORAL ONCE
Refills: 0 | Status: COMPLETED | OUTPATIENT
Start: 2023-01-21 | End: 2023-01-21

## 2023-01-21 RX ORDER — POTASSIUM CHLORIDE 20 MEQ
20 PACKET (EA) ORAL ONCE
Refills: 0 | Status: COMPLETED | OUTPATIENT
Start: 2023-01-21 | End: 2023-01-21

## 2023-01-21 RX ORDER — MAGNESIUM SULFATE 500 MG/ML
1 VIAL (ML) INJECTION ONCE
Refills: 0 | Status: COMPLETED | OUTPATIENT
Start: 2023-01-21 | End: 2023-01-21

## 2023-01-21 RX ORDER — HYDRALAZINE HCL 50 MG
10 TABLET ORAL ONCE
Refills: 0 | Status: COMPLETED | OUTPATIENT
Start: 2023-01-21 | End: 2023-01-21

## 2023-01-21 RX ORDER — FUROSEMIDE 40 MG
20 TABLET ORAL DAILY
Refills: 0 | Status: DISCONTINUED | OUTPATIENT
Start: 2023-01-21 | End: 2023-01-22

## 2023-01-21 RX ADMIN — Medication 100 GRAM(S): at 08:39

## 2023-01-21 RX ADMIN — Medication 10 MILLIGRAM(S): at 11:55

## 2023-01-21 RX ADMIN — APIXABAN 2.5 MILLIGRAM(S): 2.5 TABLET, FILM COATED ORAL at 05:56

## 2023-01-21 RX ADMIN — Medication 1000 MILLIGRAM(S): at 03:01

## 2023-01-21 RX ADMIN — Medication 20 MILLIGRAM(S): at 12:50

## 2023-01-21 RX ADMIN — Medication 20 MILLIEQUIVALENT(S): at 08:39

## 2023-01-21 RX ADMIN — LISINOPRIL 10 MILLIGRAM(S): 2.5 TABLET ORAL at 05:56

## 2023-01-21 RX ADMIN — Medication 1000 MILLIGRAM(S): at 08:39

## 2023-01-21 RX ADMIN — TAMSULOSIN HYDROCHLORIDE 0.4 MILLIGRAM(S): 0.4 CAPSULE ORAL at 22:35

## 2023-01-21 RX ADMIN — Medication 1000 MILLIGRAM(S): at 21:04

## 2023-01-21 RX ADMIN — APIXABAN 2.5 MILLIGRAM(S): 2.5 TABLET, FILM COATED ORAL at 17:23

## 2023-01-21 RX ADMIN — Medication 1000 MILLIGRAM(S): at 14:48

## 2023-01-21 RX ADMIN — ATORVASTATIN CALCIUM 20 MILLIGRAM(S): 80 TABLET, FILM COATED ORAL at 22:34

## 2023-01-21 RX ADMIN — Medication 10 MILLIGRAM(S): at 08:39

## 2023-01-21 RX ADMIN — Medication 10 MILLIGRAM(S): at 10:55

## 2023-01-21 RX ADMIN — Medication 2: at 12:50

## 2023-01-21 NOTE — DISCHARGE NOTE PROVIDER - REASON FOR ADMISSION
Elective admission for   endoscopic removal of polyps +/- Lap Right colectomy. Elective admission for   endoscopic removal of polyps converted to open Right hemicolectomy

## 2023-01-21 NOTE — DISCHARGE NOTE PROVIDER - CARE PROVIDERS DIRECT ADDRESSES
,[email or web address]

## 2023-01-21 NOTE — PROGRESS NOTE ADULT - ASSESSMENT
83yo male with a PMhx significant for DVT (R leg, 3-4 months ago per pt), renal dysfunction, HLD, HTN, DM2, CAD, colon polps, PSHx of CABG (2009; on Plavix and Eliquis), gastrectomy for PUD (1968), corneal transplant, inguinal hernia repair. Recent colonscopy significant for 2 cm transverse colon and 35 mm sessile cecal polp. S/p colonoscopy  w. polypectomy and biopsy converted to open right hemicolectomy (1/17).     LRD  Pain/nausea control  Home lisinopril and eye drops  Flomax   SCD (LEFT SIDE ONLY)/IS/SQH (q12)  ISS  Penrose drains   Eliquis for R DVT - 3-4 months ago  Kitchen  F/u uro recs  Monitor BP  Dispo: Likely home today w/ ktichen, no PT needs    83yo male with a PMhx significant for DVT (R leg, 3-4 months ago per pt), renal dysfunction, HLD, HTN, DM2, CAD, colon polps, PSHx of CABG (2009; on Plavix and Eliquis), gastrectomy for PUD (1968), corneal transplant, inguinal hernia repair. Recent colonscopy significant for 2 cm transverse colon and 35 mm sessile cecal polp. S/p colonoscopy  w. polypectomy and biopsy converted to open right hemicolectomy (1/17).     LRD  Pain/nausea control  Home lisinopril and eye drops  Repeat CBC at noon. May restart home Lasix is stable  Flomax   SCD (LEFT SIDE ONLY)/IS/SQH (q12)  ISS  Penrose drains   Eliquis for R DVT - 3-4 months ago  [place]  F/u uro recs  Monitor BP

## 2023-01-21 NOTE — PROGRESS NOTE ADULT - ASSESSMENT
A/P Making good progress. Ambulates well and is eating.   Bowel function has returned.  BP has been labile but he is back on his home BP meds.  Lasix (been taking at home) being restarted today.  To go home with kitchen and will f/u in  clinic or private office (my office can help arrange).  Been started on tamsulosin.  To call for N/V, distension, increased pain, poor po intake or urinary or other problems.  Family to help him get home and initially post discharge  Will continue ambulating

## 2023-01-21 NOTE — DISCHARGE NOTE PROVIDER - NSDCMRMEDTOKEN_GEN_ALL_CORE_FT
acetaminophen 500 mg oral tablet, chewable: 1  orally , As Needed  Eliquis 2.5 mg oral tablet: 1 tab(s) orally 2 times a day  furosemide 20 mg oral tablet: 1 tab(s) orally once a day  gabapentin 600 mg oral tablet: 1 tab(s) orally 2 times a day  lisinopril 10 mg oral tablet: 1 tab(s) orally once a day  metFORMIN 1000 mg oral tablet: 1 tab(s) orally 2 times a day  Plavix 75 mg oral tablet: 1 tab(s) orally once a day  rosuvastatin 5 mg oral tablet: 1 tab(s) orally once a day  Trulicity Pen:    Eliquis 2.5 mg oral tablet: 1 tab(s) orally 2 times a day  furosemide 20 mg oral tablet: 1 tab(s) orally once a day  gabapentin 600 mg oral tablet: 1 tab(s) orally 2 times a day  lisinopril 10 mg oral tablet: 1 tab(s) orally once a day  metFORMIN 1000 mg oral tablet: 1 tab(s) orally 2 times a day  rosuvastatin 5 mg oral tablet: 1 tab(s) orally once a day  tamsulosin 0.4 mg oral capsule: 1 cap(s) orally once a day  Trulicity Pen:    Colace 100 mg oral capsule: 1 cap(s) orally once a day     please take colace 100mg daily.   Eliquis 2.5 mg oral tablet: 1 tab(s) orally 2 times a day  furosemide 20 mg oral tablet: 1 tab(s) orally once a day  gabapentin 600 mg oral tablet: 1 tab(s) orally 2 times a day  lisinopril 10 mg oral tablet: 1 tab(s) orally once a day  metFORMIN 1000 mg oral tablet: 1 tab(s) orally 2 times a day  rosuvastatin 5 mg oral tablet: 1 tab(s) orally once a day  tamsulosin 0.4 mg oral capsule: 1 cap(s) orally once a day  tamsulosin 0.4 mg oral capsule: 1 cap(s) orally once a day    Please take flomax 0.4mg daily   Trulicity Pen:

## 2023-01-21 NOTE — DISCHARGE NOTE PROVIDER - HOSPITAL COURSE
82M with a PMHx significant for DVT (R leg on Eliquis), renal dysfunction, HLD, HTN, DM2, CAD (on ASA and plavix), colon polyps, PSHx of CABG (2009; on Plavix and Eliquis), gastrectomy for PUD (1968), corneal transplant, inguinal hernia repair. S/p colonoscopy converted to open right hemicolectomy (1/17) 82M with a PMHx significant for DVT (R leg on Eliquis), renal dysfunction, HLD, HTN, DM2, CAD (on ASA and plavix), colon polyps, PSHx of CABG (2009; on Plavix and Eliquis), gastrectomy for PUD (1968), corneal transplant, inguinal hernia repair who was found to have 2 cm transverse colon polyp and 3.5 cm sessile cecal polyp on recent colonoscopy who presented on 1/16 for elective colonoscopy with endoscopic removal with potential for conversion to open right hemicolectomy.  On 1/17, patient underwent uncomplicated open R hemicolectomy with primary anastomosis and lysis of adhesions. Procedure was uncomplicated and post operatively patient progressed well. His post operative course was complicated by urinary retention for which the patient was straight cath'ed multiple times, ultimately requiring kitchen catheter placed on POD2 (1/19). Urology was consulted for urinary retention and patient was started on flomax daily. UA and urine culture resulted as negative for infection. His post operative course was also complicated by hypertension between 160-190s systolic requiring IV labetalol and hydralazine to control.   After his procedure, patient was started on a clear liquid diet which he tolerated well without nausea or emesis. He was slowly advanced to low fiber diet on POD2 when he began to pass flatus. On POD3, patient had full return of bowel function with flatus and nonbloody bowel movements. On POD4, the penrose drains were removed from his midline wound. He was seen physical therapy who recommended home without PT needs. On day of discharge, patient was afebrile and hemodynamically stable. He was tolerating low fiber diet well without nausea or emesis. He was passing flatus and having bowel movements. His pain was well controlled on a PO regimen. Decision was made with the patient to be discharged home with kitchen catheter. He will follow up with Dr. Frankel, urology and his PCP within the next week.   82M with a PMHx significant for DVT (R leg on Eliquis), renal dysfunction, HLD, HTN, DM2, CAD (on ASA and plavix), colon polyps, PSHx of CABG (2009; on Plavix and Eliquis), gastrectomy for PUD (1968), corneal transplant, inguinal hernia repair who was found to have 2 cm transverse colon polyp and 3.5 cm sessile cecal polyp on recent colonoscopy who presented on 1/16 for elective colonoscopy with endoscopic removal with potential for conversion to open right hemicolectomy. On 1/17, patient underwent uncomplicated open R hemicolectomy with primary anastomosis and lysis of adhesions. Procedure was uncomplicated and post operatively patient progressed well. His post operative course was complicated by urinary retention for which the patient was straight cath'ed multiple times, ultimately requiring kitchen catheter placed on POD2 (1/19). Urology was consulted for urinary retention and patient was started on flomax daily. UA and urine culture resulted as negative for infection. His post operative course was also complicated by hypertension between 160-190s systolic requiring IV labetalol and hydralazine to control. After his procedure, patient was started on a clear liquid diet which he tolerated well without nausea or emesis. He was slowly advanced to low fiber diet on POD2 when he began to pass flatus. On POD3, patient had full return of bowel function with flatus and nonbloody bowel movements. On POD4, the penrose drains were removed from his midline wound. He was seen physical therapy who recommended home without PT needs. On day of discharge, patient was afebrile and hemodynamically stable. He was tolerating low fiber diet well without nausea or emesis. He was passing flatus and having bowel movements. His pain was well controlled on a PO regimen. Decision was made with the patient to be discharged home with kitchen catheter. He will follow up with Dr. Frankel, urology and his PCP within the next week.

## 2023-01-21 NOTE — DISCHARGE NOTE PROVIDER - NSDCFUADDAPPT_GEN_ALL_CORE_FT
- Please call the office to schedule a follow up appointment with Dr. Frankel within the next week. You can call the office at [phone].  - Please call urologist's office to schedule a follow up appointment regarding removal of kitchen catheter. The contact information is: [address] [place] [address], [address].  Please call [phone] to schedule an appointment  - Please follow up with your primary care physician and cardiologist within the next week regarding adjusting home medications and restarted Plavix.  - Please call the office to schedule a follow up appointment with Dr. Frankel within the next week. You can call the office at [phone].  - Please call urologist's office to schedule a follow up appointment regarding removal of kitchen catheter. The contact information is: [address] [place] [address], [address].  Please call [phone] to schedule an appointment  - Please follow up with your primary care physician and cardiologist within the next week regarding adjusting home medications and restarting Plavix.

## 2023-01-21 NOTE — DISCHARGE NOTE PROVIDER - CARE PROVIDER_API CALL
Filemon Frankel)  ColonRectal Surgery  [address] [facility], [place] [address]  Phone: [phone]  Fax: [phone]  Follow Up Time: 1 week   Filemon Frankel)  ColonRectal Surgery  [address] [facility], [place] [address]  Phone: [phone]  Fax: [phone]  Follow Up Time: 1 week    [id number], GENERIC  Phone: ()-  Fax: ()-  Follow Up Time: 1-3 days

## 2023-01-21 NOTE — DISCHARGE NOTE PROVIDER - PROVIDER TOKENS
PROVIDER:[TOKEN:[[id number]],FOLLOWUP:[1 week]] PROVIDER:[TOKEN:[[id number]],FOLLOWUP:[1 week]],PROVIDER:[TOKEN:[[id number]],FOLLOWUP:[1-3 days]]

## 2023-01-21 NOTE — DISCHARGE NOTE PROVIDER - NSDCFUADDINST_GEN_ALL_CORE_FT
Please follow a low fiber diet while at home (instructions pre-provided in surgical packet):  - vegetables should be initially cooked (baked, steamed, blanched)  - you may want to start with peeled and/or canned fruit  - please limit fat/oil intake and greasy/fried foods  - add small amounts of fiber into diet every couple of days    General Discharge Instructions:  Please resume all regular home medications unless specifically advised not to take a particular medication. Also, please take any new medications as prescribed.  Please get plenty of rest, continue to ambulate several times per day, and drink adequate amounts of fluids. Avoid lifting weights greater than 5-10 lbs until you follow-up with your surgeon, who will instruct you further regarding activity restrictions.  Avoid driving or operating heavy machinery while taking pain medications.  Please follow-up with your surgeon and Primary Care Provider (PCP) as advised.  Incision Care:  *Please call your doctor if you have increased pain, swelling, redness, or drainage from the incision site.  *Avoid swimming and baths until your follow-up appointment.  *You may shower, and wash surgical incisions with a mild soap and warm water. Gently pat the area dry.    Warning Signs:  Please call your doctor if you experience the following:  *You experience new chest pain, pressure, squeezing or tightness.  *New or worsening cough, shortness of breath, or wheeze.  *If you are vomiting and cannot keep down fluids or your medications.  *You are getting dehydrated due to continued vomiting, diarrhea, or other reasons. Signs of dehydration include dry mouth, rapid heartbeat, or feeling dizzy or faint when standing.  *You see blood or dark/black material when you vomit or have a bowel movement.  *You experience burning when you urinate, have blood in your urine, or experience a discharge.  *Your pain is not improving within 8-12 hours or is not gone within 24 hours. Call or return immediately if your pain is getting worse, changes location, or moves to your chest or back.  *You have shaking chills, or fever greater than 101.5 degrees Fahrenheit or 38 degrees Celsius.  *Any change in your symptoms, or any new symptoms that concern you.    You will be discharged home with kitchen catheter to leg bag. You have been instructed how to drain the bag. Please ensure you are draining the bag every 4-6 hours to prevent urine accumulation and potential back up into bladder.  Please follow a low fiber diet while at home (instructions pre-provided in surgical packet):  - vegetables should be initially cooked (baked, steamed, blanched)  - you may want to start with peeled and/or canned fruit  - please limit fat/oil intake and greasy/fried foods  - add small amounts of fiber into diet every couple of days    General Discharge Instructions:  Please resume all regular home medications unless specifically advised not to take a particular medication. Also, please take any new medications as prescribed.  Please take Colace and Flomax daily, as directed.  Please get plenty of rest, continue to ambulate several times per day, and drink adequate amounts of fluids. Avoid lifting weights greater than 5-10 lbs until you follow-up with your surgeon, who will instruct you further regarding activity restrictions.  Avoid driving or operating heavy machinery while taking pain medications.  Please follow-up with your surgeon and Primary Care Provider (PCP) as advised.  Incision Care:  *Please call your doctor if you have increased pain, swelling, redness, or drainage from the incision site.  *Avoid swimming and baths until your follow-up appointment.  *You may shower, and wash surgical incisions with a mild soap and warm water. Gently pat the area dry.    Warning Signs:  Please call your doctor if you experience the following:  *You experience new chest pain, pressure, squeezing or tightness.  *New or worsening cough, shortness of breath, or wheeze.  *If you are vomiting and cannot keep down fluids or your medications.  *You are getting dehydrated due to continued vomiting, diarrhea, or other reasons. Signs of dehydration include dry mouth, rapid heartbeat, or feeling dizzy or faint when standing.  *You see blood or dark/black material when you vomit or have a bowel movement.  *You experience burning when you urinate, have blood in your urine, or experience a discharge.  *Your pain is not improving within 8-12 hours or is not gone within 24 hours. Call or return immediately if your pain is getting worse, changes location, or moves to your chest or back.  *You have shaking chills, or fever greater than 101.5 degrees Fahrenheit or 38 degrees Celsius.  *Any change in your symptoms, or any new symptoms that concern you.    You will be discharged home with kitchen catheter to leg bag. You have been instructed how to drain the bag. Please ensure you are draining the bag every 4-6 hours to prevent urine accumulation and potential back up into bladder.  Please follow up with Dr. Frankel in his office in 1 week. Please call the number above to schedule an appointment.   Please follow up with the Urology clinic at [address] [place] [address] 2N this week to remove the kitchen catheter. Please follow the instructions demonstrated to you regarding emptying the kitchen catheter leg bag.   Please follow up with cardiology within this upcoming week regarding adjusting home medications and restarting plavix.   Please continue to follow up with your PCP regarding your R. leg venous stasis ulcer.     Medication adjustments:  - Please do NOT take Plavix 75mg daily, until cleared to restart by PCP and cardiologist.   - Please take Colace and Flomax daily, as directed.  - Please restart all other home medications as prescribed.     Please follow a low fiber diet while at home (instructions pre-provided in surgical packet):  - vegetables should be initially cooked (baked, steamed, blanched)  - you may want to start with peeled and/or canned fruit  - please limit fat/oil intake and greasy/fried foods  - add small amounts of fiber into diet every couple of days    General Discharge Instructions:  Please resume all regular home medications unless specifically advised not to take a particular medication. Also, please take any new medications as prescribed.  Please take Colace and Flomax daily, as directed.  Please get plenty of rest, continue to ambulate several times per day, and drink adequate amounts of fluids. Avoid lifting weights greater than 5-10 lbs until you follow-up with your surgeon, who will instruct you further regarding activity restrictions.  Avoid driving or operating heavy machinery while taking pain medications.  Please follow-up with your surgeon and Primary Care Provider (PCP) as advised.  Incision Care:  *Please call your doctor if you have increased pain, swelling, redness, or drainage from the incision site.  *Avoid swimming and baths until your follow-up appointment.  *You may shower, and wash surgical incisions with a mild soap and warm water. Gently pat the area dry.    Warning Signs:  Please call your doctor if you experience the following:  *You experience new chest pain, pressure, squeezing or tightness.  *New or worsening cough, shortness of breath, or wheeze.  *If you are vomiting and cannot keep down fluids or your medications.  *You are getting dehydrated due to continued vomiting, diarrhea, or other reasons. Signs of dehydration include dry mouth, rapid heartbeat, or feeling dizzy or faint when standing.  *You see blood or dark/black material when you vomit or have a bowel movement.  *You experience burning when you urinate, have blood in your urine, or experience a discharge.  *Your pain is not improving within 8-12 hours or is not gone within 24 hours. Call or return immediately if your pain is getting worse, changes location, or moves to your chest or back.  *You have shaking chills, or fever greater than 101.5 degrees Fahrenheit or 38 degrees Celsius.  *Any change in your symptoms, or any new symptoms that concern you.    You will be discharged home with kitchen catheter to leg bag. You have been instructed how to drain the bag. Please ensure you are draining the bag every 4-6 hours to prevent urine accumulation and potential back up into bladder.  Please follow up with Dr. Frankel in his office in 1 week. Please call the number above to schedule an appointment.   Please follow up with the Urology clinic at [address] [place] [address] 2N this week to remove the kitchen catheter. Please follow the instructions demonstrated to you regarding emptying the kitchen catheter leg bag.   Please follow up with cardiology within this upcoming week regarding adjusting home medications and restarting plavix.   Please continue to follow up with your PCP regarding your R. leg venous stasis ulcer.     Medication adjustments:  Please take tylenol 1000mg as needed every 6 hours for pain.   - Please do NOT take Plavix 75mg daily, until cleared to restart by PCP and cardiologist.   - Please take Colace and Flomax daily, as directed. Prescriptions sent to your [facility] pharmacy on [address].   - Please restart all other home medications as prescribed.     Please follow a low fiber diet while at home (instructions pre-provided in surgical packet):  - vegetables should be initially cooked (baked, steamed, blanched)  - you may want to start with peeled and/or canned fruit  - please limit fat/oil intake and greasy/fried foods  - add small amounts of fiber into diet every couple of days    General Discharge Instructions:  Please resume all regular home medications unless specifically advised not to take a particular medication. Also, please take any new medications as prescribed.  Please take Colace and Flomax daily, as directed.  Please get plenty of rest, continue to ambulate several times per day, and drink adequate amounts of fluids. Avoid lifting weights greater than 5-10 lbs until you follow-up with your surgeon, who will instruct you further regarding activity restrictions.  Avoid driving or operating heavy machinery while taking pain medications.  Please follow-up with your surgeon and Primary Care Provider (PCP) as advised.  Incision Care:  *Please call your doctor if you have increased pain, swelling, redness, or drainage from the incision site.  *Avoid swimming and baths until your follow-up appointment.  *You may shower, and wash surgical incisions with a mild soap and warm water. Gently pat the area dry.    Warning Signs:  Please call your doctor if you experience the following:  *You experience new chest pain, pressure, squeezing or tightness.  *New or worsening cough, shortness of breath, or wheeze.  *If you are vomiting and cannot keep down fluids or your medications.  *You are getting dehydrated due to continued vomiting, diarrhea, or other reasons. Signs of dehydration include dry mouth, rapid heartbeat, or feeling dizzy or faint when standing.  *You see blood or dark/black material when you vomit or have a bowel movement.  *You experience burning when you urinate, have blood in your urine, or experience a discharge.  *Your pain is not improving within 8-12 hours or is not gone within 24 hours. Call or return immediately if your pain is getting worse, changes location, or moves to your chest or back.  *You have shaking chills, or fever greater than 101.5 degrees Fahrenheit or 38 degrees Celsius.  *Any change in your symptoms, or any new symptoms that concern you.    You will be discharged home with kitchen catheter to leg bag. You have been instructed how to drain the bag. Please ensure you are draining the bag every 4-6 hours to prevent urine accumulation and potential back up into bladder.

## 2023-01-21 NOTE — PROGRESS NOTE ADULT - SUBJECTIVE AND OBJECTIVE BOX
POD 4  9 Uris  + BM, flatus.  Tolerating low residue diet.    No N/V.    Ambulating well.   Pain controlled with tylenol po  Wants to go home and has some relatives who can assist in getting home and initial home stay    Vital Signs Last 24 Hrs  T(C): 37.7 (21 Jan 2023 12:18), Max: 37.7 (21 Jan 2023 12:18)  T(F): 99.9 (21 Jan 2023 12:18), Max: 99.9 (21 Jan 2023 12:18)  HR: 71 (21 Jan 2023 12:49) (71 - 91)  BP: 159/66 (21 Jan 2023 12:49) (146/65 - 198/65)  BP(mean): --  RR: 16 (21 Jan 2023 12:18) (16 - 18)  SpO2: 100% (21 Jan 2023 12:18) (99% - 100%)    Parameters below as of 21 Jan 2023 12:18  Patient On (Oxygen Delivery Method): room air    abd: softly distended, BS+, very active, normal pitched  incision intact: penrose drains removed, DSD applied  no tenderness  ext: without change. R lower leg venous ulcer dressing has been changed    UO 1500 ml yesterday, good output today as well                          9.3    6.88  )-----------( 188      ( 21 Jan 2023 12:00 )             30.9   01-21    141  |  106  |  12  ----------------------------<  88  3.7   |  21<L>  |  0.97    Ca    9.0      21 Jan 2023 07:07  Phos  3.0     01-21  Mg     1.8     01-21

## 2023-01-21 NOTE — PROGRESS NOTE ADULT - SUBJECTIVE AND OBJECTIVE BOX
STATUS POST:  Colonoscopy, with polypectomy and biopsy if indicated, Laparoscopic right hemicolectomy    POST OPERATIVE DAY #: 4    SUBJECTIVE:   Patient seen and examined at bedside by chief during AM rounds.    Vital Signs Last 24 Hrs  T(C): 36.6 (21 Jan 2023 05:11), Max: 36.8 (20 Jan 2023 12:57)  T(F): 97.8 (21 Jan 2023 05:11), Max: 98.2 (20 Jan 2023 12:57)  HR: 74 (21 Jan 2023 05:11) (73 - 91)  BP: 175/78 (21 Jan 2023 05:11) (150/80 - 192/80)  BP(mean): --  RR: 18 (21 Jan 2023 05:11) (16 - 18)  SpO2: 100% (21 Jan 2023 05:11) (99% - 100%)    Parameters below as of 21 Jan 2023 05:11  Patient On (Oxygen Delivery Method): room air        I&O's Summary    19 Jan 2023 07:01  -  20 Jan 2023 07:00  --------------------------------------------------------  IN: 890 mL / OUT: 3150 mL / NET: -2260 mL    20 Jan 2023 07:01  -  21 Jan 2023 06:40  --------------------------------------------------------  IN: 320 mL / OUT: 1550 mL / NET: -1230 mL        Physical Exam:  General: Resting comfortably in bed, NAD  HEENT: ATNC  Pulmonary: Nonlabored breathing, no respiratory distress, no acessory muscle use noted  Cardiovascular: NSR  Abdomen: Soft, nondisteded, appropriate incisional tenderness  Extremities: WWP, SCDs in place, No significant edema appreciated    LABS:                        8.7    9.09  )-----------( 175      ( 20 Jan 2023 12:45 )             30.0     01-20    140  |  106  |  11  ----------------------------<  69<L>  4.0   |  20<L>  |  1.02    Ca    8.7      20 Jan 2023 07:30  Phos  3.1     01-20  Mg     1.9     01-20        Urinalysis Basic - ( 20 Jan 2023 12:38 )    Color: Yellow / Appearance: Clear / SG: >=1.030 / pH: x  Gluc: x / Ketone: >=80 mg/dL  / Bili: Negative / Urobili: 0.2 E.U./dL   Blood: x / Protein: 30 mg/dL / Nitrite: NEGATIVE   Leuk Esterase: NEGATIVE / RBC: 5-10 /HPF / WBC < 5 /HPF   Sq Epi: x / Non Sq Epi: 0-5 /HPF / Bacteria: None /HPF     STATUS POST:  Colonoscopy, with polypectomy and biopsy if indicated, Laparoscopic right hemicolectomy    POST OPERATIVE DAY #: 4    SUBJECTIVE:   Patient seen and examined at bedside by chief and attending during AM rounds. Patient reports he is doing well this AM. States pain is mostly resolved. States he is passing flatus and had a nonbloody bowel movement yesterday. States he is tolerating diet well. Denies nausea or emesis. States he has been out of bed and ambulating.    Vital Signs Last 24 Hrs  T(C): 36.6 (21 Jan 2023 05:11), Max: 36.8 (20 Jan 2023 12:57)  T(F): 97.8 (21 Jan 2023 05:11), Max: 98.2 (20 Jan 2023 12:57)  HR: 74 (21 Jan 2023 05:11) (73 - 91)  BP: 175/78 (21 Jan 2023 05:11) (150/80 - 192/80)  BP(mean): --  RR: 18 (21 Jan 2023 05:11) (16 - 18)  SpO2: 100% (21 Jan 2023 05:11) (99% - 100%)    Parameters below as of 21 Jan 2023 05:11  Patient On (Oxygen Delivery Method): room air        I&O's Summary    19 Jan 2023 07:01  -  20 Jan 2023 07:00  --------------------------------------------------------  IN: 890 mL / OUT: 3150 mL / NET: -2260 mL    20 Jan 2023 07:01  -  21 Jan 2023 06:40  --------------------------------------------------------  IN: 320 mL / OUT: 1550 mL / NET: -1230 mL        Physical Exam:  General: Resting comfortably in bed, NAD  HEENT: ATNC  Pulmonary: Equal chest wall expansion b/l, no respiratory distress  Cardiovascular: NSR  Abdomen: Soft, moderately distended, mild tenderness to palpation near midline incision - healing well with staples and penrose. No rebound or guarding  : Burton in place draining yellow urine  Extremities: WWP, SCDs in place, No significant edema appreciated    LABS:                        8.7    9.09  )-----------( 175      ( 20 Jan 2023 12:45 )             30.0     01-20    140  |  106  |  11  ----------------------------<  69<L>  4.0   |  20<L>  |  1.02    Ca    8.7      20 Jan 2023 07:30  Phos  3.1     01-20  Mg     1.9     01-20        Urinalysis Basic - ( 20 Jan 2023 12:38 )    Color: Yellow / Appearance: Clear / SG: >=1.030 / pH: x  Gluc: x / Ketone: >=80 mg/dL  / Bili: Negative / Urobili: 0.2 E.U./dL   Blood: x / Protein: 30 mg/dL / Nitrite: NEGATIVE   Leuk Esterase: NEGATIVE / RBC: 5-10 /HPF / WBC < 5 /HPF   Sq Epi: x / Non Sq Epi: 0-5 /HPF / Bacteria: None /HPF

## 2023-01-22 ENCOUNTER — TRANSCRIPTION ENCOUNTER (OUTPATIENT)
Age: 83
End: 2023-01-22

## 2023-01-22 VITALS
SYSTOLIC BLOOD PRESSURE: 167 MMHG | OXYGEN SATURATION: 100 % | TEMPERATURE: 99 F | HEART RATE: 92 BPM | RESPIRATION RATE: 18 BRPM | DIASTOLIC BLOOD PRESSURE: 79 MMHG

## 2023-01-22 LAB
ANION GAP SERPL CALC-SCNC: 13 MMOL/L — SIGNIFICANT CHANGE UP (ref 5–17)
BUN SERPL-MCNC: 11 MG/DL — SIGNIFICANT CHANGE UP (ref 7–23)
CALCIUM SERPL-MCNC: 9 MG/DL — SIGNIFICANT CHANGE UP (ref 8.4–10.5)
CHLORIDE SERPL-SCNC: 107 MMOL/L — SIGNIFICANT CHANGE UP (ref 96–108)
CO2 SERPL-SCNC: 19 MMOL/L — LOW (ref 22–31)
CREAT SERPL-MCNC: 1.05 MG/DL — SIGNIFICANT CHANGE UP (ref 0.5–1.3)
EGFR: 71 ML/MIN/1.73M2 — SIGNIFICANT CHANGE UP
GLUCOSE BLDC GLUCOMTR-MCNC: 129 MG/DL — HIGH (ref 70–99)
GLUCOSE BLDC GLUCOMTR-MCNC: 150 MG/DL — HIGH (ref 70–99)
GLUCOSE BLDC GLUCOMTR-MCNC: 160 MG/DL — HIGH (ref 70–99)
GLUCOSE SERPL-MCNC: 126 MG/DL — HIGH (ref 70–99)
HCT VFR BLD CALC: 27.4 % — LOW (ref 39–50)
HGB BLD-MCNC: 8.3 G/DL — LOW (ref 13–17)
MAGNESIUM SERPL-MCNC: 1.9 MG/DL — SIGNIFICANT CHANGE UP (ref 1.6–2.6)
MCHC RBC-ENTMCNC: 26 PG — LOW (ref 27–34)
MCHC RBC-ENTMCNC: 30.3 GM/DL — LOW (ref 32–36)
MCV RBC AUTO: 85.9 FL — SIGNIFICANT CHANGE UP (ref 80–100)
NRBC # BLD: 0 /100 WBCS — SIGNIFICANT CHANGE UP (ref 0–0)
PHOSPHATE SERPL-MCNC: 3.3 MG/DL — SIGNIFICANT CHANGE UP (ref 2.5–4.5)
PLATELET # BLD AUTO: 198 K/UL — SIGNIFICANT CHANGE UP (ref 150–400)
POTASSIUM SERPL-MCNC: 3.9 MMOL/L — SIGNIFICANT CHANGE UP (ref 3.5–5.3)
POTASSIUM SERPL-SCNC: 3.9 MMOL/L — SIGNIFICANT CHANGE UP (ref 3.5–5.3)
RBC # BLD: 3.19 M/UL — LOW (ref 4.2–5.8)
RBC # FLD: 20.8 % — HIGH (ref 10.3–14.5)
SODIUM SERPL-SCNC: 139 MMOL/L — SIGNIFICANT CHANGE UP (ref 135–145)
WBC # BLD: 4.97 K/UL — SIGNIFICANT CHANGE UP (ref 3.8–10.5)
WBC # FLD AUTO: 4.97 K/UL — SIGNIFICANT CHANGE UP (ref 3.8–10.5)

## 2023-01-22 PROCEDURE — 82962 GLUCOSE BLOOD TEST: CPT

## 2023-01-22 PROCEDURE — 82947 ASSAY GLUCOSE BLOOD QUANT: CPT

## 2023-01-22 PROCEDURE — C1889: CPT

## 2023-01-22 PROCEDURE — 88309 TISSUE EXAM BY PATHOLOGIST: CPT

## 2023-01-22 PROCEDURE — 36415 COLL VENOUS BLD VENIPUNCTURE: CPT

## 2023-01-22 PROCEDURE — 83735 ASSAY OF MAGNESIUM: CPT

## 2023-01-22 PROCEDURE — 84100 ASSAY OF PHOSPHORUS: CPT

## 2023-01-22 PROCEDURE — 86901 BLOOD TYPING SEROLOGIC RH(D): CPT

## 2023-01-22 PROCEDURE — 97116 GAIT TRAINING THERAPY: CPT

## 2023-01-22 PROCEDURE — 85027 COMPLETE CBC AUTOMATED: CPT

## 2023-01-22 PROCEDURE — 85018 HEMOGLOBIN: CPT

## 2023-01-22 PROCEDURE — 83036 HEMOGLOBIN GLYCOSYLATED A1C: CPT

## 2023-01-22 PROCEDURE — 82330 ASSAY OF CALCIUM: CPT

## 2023-01-22 PROCEDURE — 86850 RBC ANTIBODY SCREEN: CPT

## 2023-01-22 PROCEDURE — 86900 BLOOD TYPING SEROLOGIC ABO: CPT

## 2023-01-22 PROCEDURE — 88304 TISSUE EXAM BY PATHOLOGIST: CPT

## 2023-01-22 PROCEDURE — 80048 BASIC METABOLIC PNL TOTAL CA: CPT

## 2023-01-22 PROCEDURE — 97161 PT EVAL LOW COMPLEX 20 MIN: CPT

## 2023-01-22 PROCEDURE — 84295 ASSAY OF SERUM SODIUM: CPT

## 2023-01-22 PROCEDURE — 84132 ASSAY OF SERUM POTASSIUM: CPT

## 2023-01-22 PROCEDURE — 81001 URINALYSIS AUTO W/SCOPE: CPT

## 2023-01-22 RX ORDER — LABETALOL HCL 100 MG
10 TABLET ORAL ONCE
Refills: 0 | Status: COMPLETED | OUTPATIENT
Start: 2023-01-22 | End: 2023-01-22

## 2023-01-22 RX ORDER — DOCUSATE SODIUM 100 MG
1 CAPSULE ORAL
Qty: 30 | Refills: 0
Start: 2023-01-22 | End: 2023-02-20

## 2023-01-22 RX ORDER — TAMSULOSIN HYDROCHLORIDE 0.4 MG/1
1 CAPSULE ORAL
Qty: 0 | Refills: 0 | DISCHARGE
Start: 2023-01-22

## 2023-01-22 RX ORDER — TAMSULOSIN HYDROCHLORIDE 0.4 MG/1
1 CAPSULE ORAL
Qty: 30 | Refills: 0
Start: 2023-01-22 | End: 2023-02-20

## 2023-01-22 RX ORDER — LABETALOL HCL 100 MG
10 TABLET ORAL ONCE
Refills: 0 | Status: DISCONTINUED | OUTPATIENT
Start: 2023-01-22 | End: 2023-01-22

## 2023-01-22 RX ADMIN — Medication 1000 MILLIGRAM(S): at 03:01

## 2023-01-22 RX ADMIN — Medication 2: at 12:31

## 2023-01-22 RX ADMIN — APIXABAN 2.5 MILLIGRAM(S): 2.5 TABLET, FILM COATED ORAL at 17:39

## 2023-01-22 RX ADMIN — Medication 1000 MILLIGRAM(S): at 10:44

## 2023-01-22 RX ADMIN — Medication 20 MILLIGRAM(S): at 05:49

## 2023-01-22 RX ADMIN — Medication 10 MILLIGRAM(S): at 00:10

## 2023-01-22 RX ADMIN — APIXABAN 2.5 MILLIGRAM(S): 2.5 TABLET, FILM COATED ORAL at 05:49

## 2023-01-22 RX ADMIN — TAMSULOSIN HYDROCHLORIDE 0.4 MILLIGRAM(S): 0.4 CAPSULE ORAL at 12:31

## 2023-01-22 RX ADMIN — LISINOPRIL 10 MILLIGRAM(S): 2.5 TABLET ORAL at 05:49

## 2023-01-22 RX ADMIN — Medication 1000 MILLIGRAM(S): at 16:34

## 2023-01-22 NOTE — PROGRESS NOTE ADULT - SUBJECTIVE AND OBJECTIVE BOX
INTERVAL HPI/OVERNIGHT EVENTS:  Yesterday, -190s - given labetolol 10 x2. Tolerating LRD -n/-v. Hb stable. Restarted home lasix 20. D/C'd penrose drain. Patient seen and examined by chief resident and team on AM rounds. Tolerating LRD, -n/-v/+BF.     STATUS POST: s/p c-scope with polypectomy and biopsy, converted to open R. hemicolectomy (1/17)    POST OPERATIVE DAY #: 5    SUBJECTIVE:  MEDICATIONS  (STANDING):  acetaminophen     Tablet .. 1000 milliGRAM(s) Oral every 6 hours  apixaban 2.5 milliGRAM(s) Oral every 12 hours  atorvastatin 20 milliGRAM(s) Oral daily  dextrose 5%. 1000 milliLiter(s) (100 mL/Hr) IV Continuous <Continuous>  dextrose 5%. 1000 milliLiter(s) (50 mL/Hr) IV Continuous <Continuous>  dextrose 50% Injectable 25 Gram(s) IV Push once  dextrose 50% Injectable 12.5 Gram(s) IV Push once  dextrose 50% Injectable 25 Gram(s) IV Push once  Fluorometholone acetate 0.1% (Flarex) 1 Drop(s) 1 Drop(s) Left EYE three times a day  furosemide    Tablet 20 milliGRAM(s) Oral daily  glucagon  Injectable 1 milliGRAM(s) IntraMuscular once  insulin lispro (ADMELOG) corrective regimen sliding scale   SubCutaneous Before meals and at bedtime  lisinopril 10 milliGRAM(s) Oral every 24 hours  Netarsudil 0.02% (Rhopressa) 1 Drop(s) 1 Drop(s) Left EYE at bedtime  tamsulosin 0.4 milliGRAM(s) Oral daily    MEDICATIONS  (PRN):  dextrose Oral Gel 15 Gram(s) Oral once PRN Blood Glucose LESS THAN 70 milliGRAM(s)/deciliter  oxyCODONE    IR 5 milliGRAM(s) Oral every 4 hours PRN Moderate Pain (4 - 6)  oxyCODONE    IR 10 milliGRAM(s) Oral every 4 hours PRN Severe Pain (7 - 10)      Vital Signs Last 24 Hrs  T(C): 36.5 (22 Jan 2023 05:09), Max: 37.7 (21 Jan 2023 12:18)  T(F): 97.7 (22 Jan 2023 05:09), Max: 99.9 (21 Jan 2023 12:18)  HR: 82 (22 Jan 2023 05:09) (71 - 87)  BP: 153/45 (22 Jan 2023 05:09) (146/65 - 198/65)  BP(mean): 81 (22 Jan 2023 05:09) (81 - 112)  RR: 18 (22 Jan 2023 05:09) (16 - 18)  SpO2: 99% (22 Jan 2023 05:09) (99% - 100%)    Parameters below as of 22 Jan 2023 05:09  Patient On (Oxygen Delivery Method): room air      PHYSICAL EXAM:  Constitutional: A&Ox3  Respiratory: non labored breathing, no respiratory distress  Cardiovascular: NSR, RRR  Gastrointestinal: Soft, NTND abdomen. Midline incision c/d/i.   Extremities: (-) edema          I&O's Detail    21 Jan 2023 07:01  -  22 Jan 2023 07:00  --------------------------------------------------------  IN:    Oral Fluid: 840 mL  Total IN: 840 mL    OUT:    Indwelling Catheter - Urethral (mL): 1700 mL  Total OUT: 1700 mL    Total NET: -860 mL          LABS:                        9.3    6.88  )-----------( 188      ( 21 Jan 2023 12:00 )             30.9     01-21    141  |  106  |  12  ----------------------------<  88  3.7   |  21<L>  |  0.97    Ca    9.0      21 Jan 2023 07:07  Phos  3.0     01-21  Mg     1.8     01-21        Urinalysis Basic - ( 20 Jan 2023 12:38 )    Color: Yellow / Appearance: Clear / SG: >=1.030 / pH: x  Gluc: x / Ketone: >=80 mg/dL  / Bili: Negative / Urobili: 0.2 E.U./dL   Blood: x / Protein: 30 mg/dL / Nitrite: NEGATIVE   Leuk Esterase: NEGATIVE / RBC: 5-10 /HPF / WBC < 5 /HPF   Sq Epi: x / Non Sq Epi: 0-5 /HPF / Bacteria: None /HPF        RADIOLOGY & ADDITIONAL STUDIES:

## 2023-01-22 NOTE — PROGRESS NOTE ADULT - REASON FOR ADMISSION
Elective admission for   endoscopic removal of polyps +/- Lap Right colectomy.

## 2023-01-22 NOTE — DISCHARGE NOTE NURSING/CASE MANAGEMENT/SOCIAL WORK - NSDCPNINST_GEN_ALL_CORE
Call Dr. Frankel if have any questions or concerns. Educational material given: [email or web address] Online Patient Education- 1. Surgical Wound Discharge Instructions 2. How To Care For Your Burton Catheter.

## 2023-01-22 NOTE — DISCHARGE NOTE NURSING/CASE MANAGEMENT/SOCIAL WORK - PATIENT PORTAL LINK FT
You can access the FollowMyHealth Patient Portal offered by [facility] by registering at the following website: [email or web address]. By joining [facility]’s FollowMyHealth portal, you will also be able to view your health information using other applications (apps) compatible with our system.

## 2023-01-22 NOTE — PROGRESS NOTE ADULT - PROVIDER SPECIALTY LIST ADULT
Colorectal Surgery
Surgery
Colorectal Surgery
Surgery
Colorectal Surgery
Colorectal Surgery
Surgery

## 2023-01-22 NOTE — DISCHARGE NOTE NURSING/CASE MANAGEMENT/SOCIAL WORK - NSDCPEFALRISK_GEN_ALL_CORE
For information on Fall & Injury Prevention, visit: [email or web address] OR  [email or web address] OR  https://www.cdc.gov/steadi/patient.html

## 2023-01-22 NOTE — PROGRESS NOTE ADULT - ASSESSMENT
83yo male with a PMhx significant for DVT (R leg, 3-4 months ago per pt), renal dysfunction, HLD, HTN, DM2, CAD, colon polps, PSHx of CABG (2009; on Plavix and Eliquis), gastrectomy for PUD (1968), corneal transplant, inguinal hernia repair. Recent colonscopy significant for 2 cm transverse colon and 35 mm sessile cecal polp. S/p colonoscopy  w. polypectomy and biopsy converted to open right hemicolectomy (1/17).     Plan:  LRD  Pain/nausea control  Home lisinopril and eye drops  Flomax   SCD (LEFT SIDE ONLY)/IS/SQH (q12)  ISS  Penrose drains   Eliquis for R DVT - 3-4 months ago  C/w Kitchen  Appreciate uro recs  Dispo: home w/ kitchen, no PT needs

## 2023-01-22 NOTE — DISCHARGE NOTE NURSING/CASE MANAGEMENT/SOCIAL WORK - NSDCFUADDAPPT_GEN_ALL_CORE_FT
- Please call the office to schedule a follow up appointment with Dr. Frankel within the next week. You can call the office at [phone].  - Please call urologist's office to schedule a follow up appointment regarding removal of kitchen catheter. The contact information is: [address] [place] [address], [address].  Please call [phone] to schedule an appointment  - Please follow up with your primary care physician and cardiologist within the next week regarding adjusting home medications and restarting Plavix.

## 2023-01-24 LAB — SURGICAL PATHOLOGY STUDY: SIGNIFICANT CHANGE UP

## 2023-01-25 PROBLEM — N28.9 DISORDER OF KIDNEY AND URETER, UNSPECIFIED: Chronic | Status: ACTIVE | Noted: 2023-01-16

## 2023-01-25 PROBLEM — I82.409 ACUTE EMBOLISM AND THROMBOSIS OF UNSPECIFIED DEEP VEINS OF UNSPECIFIED LOWER EXTREMITY: Chronic | Status: ACTIVE | Noted: 2023-01-09

## 2023-01-26 ENCOUNTER — APPOINTMENT (OUTPATIENT)
Dept: UROLOGY | Facility: CLINIC | Age: 83
End: 2023-01-26
Payer: MEDICARE

## 2023-01-26 PROCEDURE — 99204 OFFICE O/P NEW MOD 45 MIN: CPT

## 2023-01-26 NOTE — ASSESSMENT
[FreeTextEntry1] : 83 year old man s/p colectomy with post-op urinary retention. Catheter removed today and patient able to void without difficulty. PVR low. He has baseline urinary symptoms, will have him follow up in two weeks to discuss management options and reassess emptying. Return precautions discussed with patient, including fever, chills, hematuria, inability to urinate.\par  - F/U in 2 weeks for UA, IPSS, PVR\par

## 2023-01-26 NOTE — HISTORY OF PRESENT ILLNESS
[FreeTextEntry1] : 83 year old man s/p lap colectomy 23 with post-op urinary retention. He was discharged with a kitchen catheter. \par \par No prior episode of urinary retention. He was not taking any prostate medications, but was discharged with prescription for flomax. At baseline, he has weak stream, incomplete emptying, hesitancy and nocturia 2-3 times at night. He is unsure of prior PSAs, last saw urologist about 10-15 years ago. He has family history of prostate cancer in uncle and cousin, both  from prostate cancer. He has aunts and cousins with breast cancer as well. No recent fevers, chills, hematuria, flank pain. He is having regular bowel movements, not taking narcotics. \par \par Bladder filled with 120 cc, patient able to void 110 cc without difficulty. PVR 25 cc

## 2023-01-26 NOTE — LETTER BODY
[Dear  ___] : Dear  [unfilled], [Courtesy Letter:] : I had the pleasure of seeing your patient, [unfilled], in my office today. [Please see my note below.] : Please see my note below. [Consult Closing:] : Thank you very much for allowing me to participate in the care of this patient.  If you have any questions, please do not hesitate to contact me. [Sincerely,] : Sincerely, [FreeTextEntry3] : Romeo Piña MD

## 2023-01-26 NOTE — PHYSICAL EXAM
[General Appearance - Well Developed] : well developed [General Appearance - Well Nourished] : well nourished [Normal Appearance] : normal appearance [Well Groomed] : well groomed [General Appearance - In No Acute Distress] : no acute distress [Edema] : no peripheral edema [Respiration, Rhythm And Depth] : normal respiratory rhythm and effort [Exaggerated Use Of Accessory Muscles For Inspiration] : no accessory muscle use [Abdomen Soft] : soft [Abdomen Tenderness] : non-tender [Costovertebral Angle Tenderness] : no ~M costovertebral angle tenderness [Urethral Meatus] : meatus normal [Urinary Bladder Findings] : the bladder was normal on palpation [Scrotum] : the scrotum was normal [Testes Mass (___cm)] : there were no testicular masses [No Prostate Nodules] : no prostate nodules [Prostate Size ___ gm] : prostate size [unfilled] gm [Normal Station and Gait] : the gait and station were normal for the patient's age [] : no rash [No Focal Deficits] : no focal deficits [Oriented To Time, Place, And Person] : oriented to person, place, and time [Affect] : the affect was normal [Mood] : the mood was normal [Not Anxious] : not anxious [FreeTextEntry1] : S

## 2023-01-27 ENCOUNTER — APPOINTMENT (OUTPATIENT)
Dept: COLORECTAL SURGERY | Facility: CLINIC | Age: 83
End: 2023-01-27
Payer: MEDICARE

## 2023-01-27 VITALS
HEIGHT: 66 IN | WEIGHT: 153 LBS | DIASTOLIC BLOOD PRESSURE: 66 MMHG | HEART RATE: 86 BPM | TEMPERATURE: 97.3 F | SYSTOLIC BLOOD PRESSURE: 153 MMHG | OXYGEN SATURATION: 100 % | BODY MASS INDEX: 24.59 KG/M2

## 2023-01-27 DIAGNOSIS — Z95.1 PRESENCE OF AORTOCORONARY BYPASS GRAFT: ICD-10-CM

## 2023-01-27 DIAGNOSIS — K63.5 POLYP OF COLON: ICD-10-CM

## 2023-01-27 DIAGNOSIS — I87.2 VENOUS INSUFFICIENCY (CHRONIC) (PERIPHERAL): ICD-10-CM

## 2023-01-27 DIAGNOSIS — K66.0 PERITONEAL ADHESIONS (POSTPROCEDURAL) (POSTINFECTION): ICD-10-CM

## 2023-01-27 DIAGNOSIS — I97.3 POSTPROCEDURAL HYPERTENSION: ICD-10-CM

## 2023-01-27 DIAGNOSIS — K64.4 RESIDUAL HEMORRHOIDAL SKIN TAGS: ICD-10-CM

## 2023-01-27 DIAGNOSIS — Z53.31 LAPAROSCOPIC SURGICAL PROCEDURE CONVERTED TO OPEN PROCEDURE: ICD-10-CM

## 2023-01-27 DIAGNOSIS — Z94.7 CORNEAL TRANSPLANT STATUS: ICD-10-CM

## 2023-01-27 DIAGNOSIS — Z90.3 ACQUIRED ABSENCE OF STOMACH [PART OF]: ICD-10-CM

## 2023-01-27 DIAGNOSIS — R33.8 OTHER RETENTION OF URINE: ICD-10-CM

## 2023-01-27 DIAGNOSIS — L97.919 NON-PRESSURE CHRONIC ULCER OF UNSPECIFIED PART OF RIGHT LOWER LEG WITH UNSPECIFIED SEVERITY: ICD-10-CM

## 2023-01-27 DIAGNOSIS — K64.8 OTHER HEMORRHOIDS: ICD-10-CM

## 2023-01-27 DIAGNOSIS — Z86.718 PERSONAL HISTORY OF OTHER VENOUS THROMBOSIS AND EMBOLISM: ICD-10-CM

## 2023-01-27 DIAGNOSIS — D12.0 BENIGN NEOPLASM OF CECUM: ICD-10-CM

## 2023-01-27 DIAGNOSIS — N40.1 BENIGN PROSTATIC HYPERPLASIA WITH LOWER URINARY TRACT SYMPTOMS: ICD-10-CM

## 2023-01-27 PROCEDURE — 99024 POSTOP FOLLOW-UP VISIT: CPT

## 2023-01-27 NOTE — PHYSICAL EXAM
[Exam Deferred] : exam was deferred [de-identified] : soft, non tender, incision intact.  All staples removed and steri strips applied.  no distension [de-identified] : NAD

## 2023-01-27 NOTE — ASSESSMENT
[FreeTextEntry1] : Doing well postop.\par GI function is fine.  Voiding since kitchen removed yesterday by Dr. Piña.\par Wounds are good.  Steristrips applied.\par Path shows no invasive cancer and therefore no need for chemo or other Rx.\par Repeat Colonoscopy in 1 year with Dr. Ortiz.\par To call for problems. \par \par Note: Besides right rosalia had colonoscopy and snare removal of 2 other 1-1.5 cm polyps that were lost and not subtmitted to path.  Clinically, they were c/w adenomas.

## 2023-01-27 NOTE — HISTORY OF PRESENT ILLNESS
[FreeTextEntry1] : POD 10 s/p OLVIN and right hemocolectomy.  Also had colonoscopy and snare removal of 2 other 1-1.5 cm polyps that were lost and not subtmitted to path.  Clinically, they were c/w adenomas.\par Eating well and having daily BM's.  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   No complaints.\joselo Has seen Dr. Vidal since the surgery.

## 2023-02-08 ENCOUNTER — APPOINTMENT (OUTPATIENT)
Dept: UROLOGY | Facility: CLINIC | Age: 83
End: 2023-02-08
Payer: MEDICARE

## 2023-02-08 DIAGNOSIS — R33.8 OTHER POSTPROCEDURAL COMPLICATIONS AND DISORDERS OF GENITOURINARY SYSTEM: ICD-10-CM

## 2023-02-08 DIAGNOSIS — N99.89 OTHER POSTPROCEDURAL COMPLICATIONS AND DISORDERS OF GENITOURINARY SYSTEM: ICD-10-CM

## 2023-02-08 LAB
BILIRUB UR QL STRIP: NORMAL
CLARITY UR: CLEAR
COLLECTION METHOD: NORMAL
GLUCOSE UR-MCNC: NORMAL
HCG UR QL: 0.2 EU/DL
HGB UR QL STRIP.AUTO: NORMAL
KETONES UR-MCNC: NORMAL
LEUKOCYTE ESTERASE UR QL STRIP: NORMAL
NITRITE UR QL STRIP: NORMAL
PH UR STRIP: 5
PROT UR STRIP-MCNC: NORMAL
SP GR UR STRIP: 1.03

## 2023-02-08 PROCEDURE — 99214 OFFICE O/P EST MOD 30 MIN: CPT | Mod: 25

## 2023-02-08 PROCEDURE — 51798 US URINE CAPACITY MEASURE: CPT

## 2023-02-08 PROCEDURE — 81003 URINALYSIS AUTO W/O SCOPE: CPT | Mod: QW

## 2023-02-08 RX ORDER — TADALAFIL 5 MG/1
5 TABLET ORAL
Qty: 90 | Refills: 3 | Status: ACTIVE | COMMUNITY
Start: 2023-02-08 | End: 1900-01-01

## 2023-02-08 NOTE — ASSESSMENT
[FreeTextEntry1] : 83 year old man s/p colectomy with post-op urinary retention with successful void trial as outpatient. He has mild dysuria, so will assess for infection. He has baseline urinary symptoms, primarily bothered by weak stream and urgency. Previously took flomax, but bothered by retrograde ejaculation and does not want any treatment that causes this. \par \par Medical management with alpha-blockers or PDE5-inhibitors, which facilitates bladder emptying, is the first-line therapy. Furthermore, 5-alpha reductase inhibitors to shrink the prostate and/or anticholinergics to relax the bladder may also be added. Indications for surgical intervention include urinary retention, urinary tract infection, evidence of uropathy and worsening renal function, bladder stones, and gross hematuria.\par \par We discussed different therapeutic options including TURP, PVP, simple prostatectomy (open, laparoscopic or transurethral laser enucleation), Aquablation, Urolift therapy, and Rezum in addition to full continuation of medical therapy. Discussed the issues of incontinence, retrograde ejaculation, erectile dysfunction, irritative voiding symptoms, injury to urethra and bladder, persistence of irritative voiding symptoms, urethral stricture or bladder neck contracture, need for open surgery to repair the injury, erectile dysfunction and infertility.\par  - UA, UCx\par  - Begin tadalafil 5 mg daily\par  - Patient instructed to ask cardiologist if ok to take medication\par  - Patient instructed not to take flomax, which had been prescribed from hospital discharge\par  - Patient provided with written instruction\par  - F/U in 1 month\par

## 2023-02-08 NOTE — HISTORY OF PRESENT ILLNESS
[FreeTextEntry1] : 23: 83 year old man s/p lap colectomy 23 with post-op urinary retention. He was discharged with a kitchen catheter. \par \par No prior episode of urinary retention. He was not taking any prostate medications, but was discharged with prescription for flomax. At baseline, he has weak stream, incomplete emptying, hesitancy and nocturia 2-3 times at night. He is unsure of prior PSAs, last saw urologist about 10-15 years ago. He has family history of prostate cancer in uncle and cousin, both  from prostate cancer. He has aunts and cousins with breast cancer as well. No recent fevers, chills, hematuria, flank pain. He is having regular bowel movements, not taking narcotics. \par \par Bladder filled with 120 cc, patient able to void 110 cc without difficulty. PVR 25 cc\par \par 23: Presents for follow up. States he is voiding back to baseline. He has mild urgency and dysuriia that is new. No fevers, chills, hematuria. Not currently taking any prostate medications.\par \par IPSS: patient refused\par PVR 7 cc\par UA with small leuks, 100 protein, trace ketones

## 2023-02-08 NOTE — PHYSICAL EXAM
[General Appearance - Well Developed] : well developed [General Appearance - Well Nourished] : well nourished [Normal Appearance] : normal appearance [Well Groomed] : well groomed [General Appearance - In No Acute Distress] : no acute distress [Abdomen Soft] : soft [Abdomen Tenderness] : non-tender [Costovertebral Angle Tenderness] : no ~M costovertebral angle tenderness [Edema] : no peripheral edema [] : no respiratory distress [Respiration, Rhythm And Depth] : normal respiratory rhythm and effort [Exaggerated Use Of Accessory Muscles For Inspiration] : no accessory muscle use [Oriented To Time, Place, And Person] : oriented to person, place, and time [Affect] : the affect was normal [Mood] : the mood was normal [Not Anxious] : not anxious [Normal Station and Gait] : the gait and station were normal for the patient's age [No Focal Deficits] : no focal deficits [FreeTextEntry1] : Surgical incisions intact with steristrips

## 2023-02-10 LAB
APPEARANCE: CLEAR
BACTERIA: NEGATIVE
BILIRUBIN URINE: NEGATIVE
BLOOD URINE: NEGATIVE
COLOR: YELLOW
GLUCOSE QUALITATIVE U: NEGATIVE
HYALINE CASTS: 2 /LPF
KETONES URINE: NORMAL
LEUKOCYTE ESTERASE URINE: ABNORMAL
MICROSCOPIC-UA: NORMAL
NITRITE URINE: NEGATIVE
PH URINE: 5.5
PROTEIN URINE: ABNORMAL
RED BLOOD CELLS URINE: 1 /HPF
SPECIFIC GRAVITY URINE: >=1.03
SQUAMOUS EPITHELIAL CELLS: 0 /HPF
URINE COMMENTS: NORMAL
UROBILINOGEN URINE: NORMAL
WHITE BLOOD CELLS URINE: 7 /HPF

## 2023-02-16 LAB — BACTERIA UR CULT: ABNORMAL

## 2023-03-08 ENCOUNTER — RESULT CHARGE (OUTPATIENT)
Age: 83
End: 2023-03-08

## 2023-03-08 ENCOUNTER — APPOINTMENT (OUTPATIENT)
Dept: UROLOGY | Facility: CLINIC | Age: 83
End: 2023-03-08
Payer: MEDICARE

## 2023-03-08 VITALS
TEMPERATURE: 98.3 F | HEART RATE: 91 BPM | DIASTOLIC BLOOD PRESSURE: 51 MMHG | BODY MASS INDEX: 24.11 KG/M2 | SYSTOLIC BLOOD PRESSURE: 124 MMHG | OXYGEN SATURATION: 99 % | WEIGHT: 150 LBS | HEIGHT: 66 IN

## 2023-03-08 PROCEDURE — 51798 US URINE CAPACITY MEASURE: CPT

## 2023-03-08 PROCEDURE — 99214 OFFICE O/P EST MOD 30 MIN: CPT | Mod: 25

## 2023-03-08 NOTE — ASSESSMENT
[FreeTextEntry1] : 83 year old man s/p colectomy with post-op urinary retention with successful void trial as outpatient. He has mild dysuria, so will assess for infection. He has baseline urinary symptoms, primarily bothered by weak stream and urgency. Previously took flomax, but bothered by retrograde ejaculation and does not want any treatment that causes this. \par \par Medical management with alpha-blockers or PDE5-inhibitors, which facilitates bladder emptying, is the first-line therapy. Furthermore, 5-alpha reductase inhibitors to shrink the prostate and/or anticholinergics to relax the bladder may also be added. Indications for surgical intervention include urinary retention, urinary tract infection, evidence of uropathy and worsening renal function, bladder stones, and gross hematuria.\par \par  - UA, UCx. If coag negative staph again, will treat\par  - Begin tadalafil 5 mg daily\par  - Patient instructed to ask cardiologist if ok to take medication\par  - Patient instructed not to take flomax, which had been prescribed from hospital discharge\par  - Patient provided with written instruction\par  - F/U in 3 month\par

## 2023-03-08 NOTE — HISTORY OF PRESENT ILLNESS
[FreeTextEntry1] : 23: 83 year old man s/p lap colectomy 23 with post-op urinary retention. He was discharged with a kitchen catheter. \par \par No prior episode of urinary retention. He was not taking any prostate medications, but was discharged with prescription for flomax. At baseline, he has weak stream, incomplete emptying, hesitancy and nocturia 2-3 times at night. He is unsure of prior PSAs, last saw urologist about 10-15 years ago. He has family history of prostate cancer in uncle and cousin, both  from prostate cancer. He has aunts and cousins with breast cancer as well. No recent fevers, chills, hematuria, flank pain. He is having regular bowel movements, not taking narcotics. \par \par Bladder filled with 120 cc, patient able to void 110 cc without difficulty. PVR 25 cc\par \par 23: Presents for follow up. States he is voiding back to baseline. He has mild urgency and dysuriia that is new. No fevers, chills, hematuria. Not currently taking any prostate medications.\par \par IPSS: patient refused\par PVR 7 cc\par UA with small leuks, 100 protein, trace ketones\par \par 3/8/23: Returns for follow up. He has not started tadalafil. Has not seen cardiologist to discuss starting the medication. He has ongoing mild dysuria. No fevers, chills, hematuria. Urine culture from last visit grew coag negative staph, so it was not treated at that time.\par \par PVR 3 cc\par UA with trace ketones, 100 protein, trace leuks

## 2023-03-09 LAB
APPEARANCE: CLEAR
BACTERIA: NEGATIVE
BILIRUB UR QL STRIP: NORMAL
BILIRUBIN URINE: NEGATIVE
BLOOD URINE: NEGATIVE
CLARITY UR: CLEAR
COLLECTION METHOD: NORMAL
COLOR: YELLOW
GLUCOSE QUALITATIVE U: NEGATIVE
GLUCOSE UR-MCNC: NORMAL
HCG UR QL: 0.2 EU/DL
HGB UR QL STRIP.AUTO: NORMAL
HYALINE CASTS: 2 /LPF
KETONES UR-MCNC: NORMAL
KETONES URINE: NORMAL
LEUKOCYTE ESTERASE UR QL STRIP: NORMAL
LEUKOCYTE ESTERASE URINE: ABNORMAL
MICROSCOPIC-UA: NORMAL
NITRITE UR QL STRIP: NORMAL
NITRITE URINE: NEGATIVE
PH UR STRIP: 5
PH URINE: 6
PROT UR STRIP-MCNC: 100
PROTEIN URINE: ABNORMAL
RED BLOOD CELLS URINE: 4 /HPF
SP GR UR STRIP: 1.03
SPECIFIC GRAVITY URINE: 1.03
SQUAMOUS EPITHELIAL CELLS: 3 /HPF
UROBILINOGEN URINE: NORMAL
WHITE BLOOD CELLS URINE: 14 /HPF

## 2023-03-20 LAB — BACTERIA UR CULT: ABNORMAL

## 2023-03-20 RX ORDER — SULFAMETHOXAZOLE AND TRIMETHOPRIM 800; 160 MG/1; MG/1
800-160 TABLET ORAL
Qty: 10 | Refills: 0 | Status: ACTIVE | COMMUNITY
Start: 2023-03-20 | End: 1900-01-01

## 2023-05-10 NOTE — DISCHARGE NOTE NURSING/CASE MANAGEMENT/SOCIAL WORK - OTHER MODE OF TRANSPORTATION
Patient returned call and was able to schedule at [facility] location
Pt left [employer] msg requesting an appt. LVM to return call to schedule.
to front of hospital from transport

## 2023-07-07 ENCOUNTER — APPOINTMENT (OUTPATIENT)
Dept: UROLOGY | Facility: CLINIC | Age: 83
End: 2023-07-07
Payer: MEDICARE

## 2023-07-07 VITALS
OXYGEN SATURATION: 99 % | DIASTOLIC BLOOD PRESSURE: 76 MMHG | SYSTOLIC BLOOD PRESSURE: 145 MMHG | HEART RATE: 93 BPM | TEMPERATURE: 98.3 F

## 2023-07-07 LAB
BILIRUB UR QL STRIP: NORMAL
CLARITY UR: CLEAR
COLLECTION METHOD: NORMAL
GLUCOSE UR-MCNC: NORMAL
HCG UR QL: 0.2 EU/DL
HGB UR QL STRIP.AUTO: NORMAL
KETONES UR-MCNC: NORMAL
LEUKOCYTE ESTERASE UR QL STRIP: NORMAL
NITRITE UR QL STRIP: NORMAL
PH UR STRIP: 5.5
PROT UR STRIP-MCNC: 300
SP GR UR STRIP: 1.03

## 2023-07-07 PROCEDURE — 99213 OFFICE O/P EST LOW 20 MIN: CPT

## 2023-07-07 NOTE — HISTORY OF PRESENT ILLNESS
[FreeTextEntry1] : 23: 83 year old man s/p lap colectomy 23 with post-op urinary retention. He was discharged with a kitchen catheter. \par \par No prior episode of urinary retention. He was not taking any prostate medications, but was discharged with prescription for flomax. At baseline, he has weak stream, incomplete emptying, hesitancy and nocturia 2-3 times at night. He is unsure of prior PSAs, last saw urologist about 10-15 years ago. He has family history of prostate cancer in uncle and cousin, both  from prostate cancer. He has aunts and cousins with breast cancer as well. No recent fevers, chills, hematuria, flank pain. He is having regular bowel movements, not taking narcotics. \par \par Bladder filled with 120 cc, patient able to void 110 cc without difficulty. PVR 25 cc\par \par 23: Presents for follow up. States he is voiding back to baseline. He has mild urgency and dysuriia that is new. No fevers, chills, hematuria. Not currently taking any prostate medications.\par \par IPSS: patient refused\par PVR 7 cc\par UA with small leuks, 100 protein, trace ketones\par \par 3/8/23: Returns for follow up. He has not started tadalafil. Has not seen cardiologist to discuss starting the medication. He has ongoing mild dysuria. No fevers, chills, hematuria. Urine culture from last visit grew coag negative staph, so it was not treated at that time.\par \par PVR 3 cc\par UA with trace ketones, 100 protein, trace leuks\par \par 23: Doing well. Took antibiotics for staph epi UTI with resolution of dysuria. Urinary symptoms stable. He was told by PCP not to take tadalafil. Not currently taking BPH medicaiton.\par \par IPSS 23, QOL 2

## 2023-07-07 NOTE — ASSESSMENT
[FreeTextEntry1] : 83 year old man s/p colectomy with post-op urinary retention with successful void trial as outpatient. He has baseline urinary symptoms, primarily bothered by weak stream and urgency. Previously took flomax, but bothered by retrograde ejaculation and does not want any treatment that causes this. Told not to take tadalafil by PCP. He is happy enough with current symptoms. \par  - F/U in 12 months\par

## 2023-07-07 NOTE — PHYSICAL EXAM
[General Appearance - Well Developed] : well developed [General Appearance - Well Nourished] : well nourished [Normal Appearance] : normal appearance [Well Groomed] : well groomed [General Appearance - In No Acute Distress] : no acute distress [Abdomen Soft] : soft [Abdomen Tenderness] : non-tender [Costovertebral Angle Tenderness] : no ~M costovertebral angle tenderness [Edema] : no peripheral edema [Respiration, Rhythm And Depth] : normal respiratory rhythm and effort [] : no respiratory distress [Exaggerated Use Of Accessory Muscles For Inspiration] : no accessory muscle use [Oriented To Time, Place, And Person] : oriented to person, place, and time [Affect] : the affect was normal [Mood] : the mood was normal [Not Anxious] : not anxious [Normal Station and Gait] : the gait and station were normal for the patient's age [No Focal Deficits] : no focal deficits

## 2023-07-10 ENCOUNTER — NON-APPOINTMENT (OUTPATIENT)
Age: 83
End: 2023-07-10

## 2023-07-10 LAB
APPEARANCE: CLEAR
BACTERIA: ABNORMAL /HPF
BILIRUBIN URINE: NEGATIVE
BLOOD URINE: NEGATIVE
CAST: 1 /LPF
COLOR: YELLOW
EPITHELIAL CELLS: 4 /HPF
GLUCOSE QUALITATIVE U: NEGATIVE
KETONES URINE: NORMAL
LEUKOCYTE ESTERASE URINE: NEGATIVE
MICROSCOPIC-UA: NORMAL
NITRITE URINE: NEGATIVE
PH URINE: 5.5
PROTEIN URINE: ABNORMAL
RED BLOOD CELLS URINE: 0 /HPF
SPECIFIC GRAVITY URINE: >=1.03
UROBILINOGEN URINE: NORMAL
WHITE BLOOD CELLS URINE: 6 /HPF

## 2023-07-11 LAB — BACTERIA UR CULT: ABNORMAL

## 2023-09-26 ENCOUNTER — NON-APPOINTMENT (OUTPATIENT)
Age: 83
End: 2023-09-26

## 2023-10-04 ENCOUNTER — APPOINTMENT (OUTPATIENT)
Dept: UROLOGY | Facility: CLINIC | Age: 83
End: 2023-10-04
Payer: MEDICARE

## 2023-10-04 DIAGNOSIS — K40.90 UNILATERAL INGUINAL HERNIA, W/OUT OBSTRUCTION OR GANGRENE, NOT SPECIFIED AS RECURRENT: ICD-10-CM

## 2023-10-04 LAB
BILIRUB UR QL STRIP: NORMAL
CLARITY UR: CLEAR
COLLECTION METHOD: NORMAL
GLUCOSE UR-MCNC: NORMAL
HCG UR QL: 0.2 EU/DL
HGB UR QL STRIP.AUTO: NORMAL
KETONES UR-MCNC: NORMAL
LEUKOCYTE ESTERASE UR QL STRIP: NORMAL
NITRITE UR QL STRIP: NORMAL
PH UR STRIP: 5.5
PROT UR STRIP-MCNC: NORMAL
SP GR UR STRIP: >=1.03

## 2023-10-04 PROCEDURE — 51798 US URINE CAPACITY MEASURE: CPT

## 2023-10-04 PROCEDURE — 81003 URINALYSIS AUTO W/O SCOPE: CPT | Mod: QW

## 2023-10-04 PROCEDURE — 99214 OFFICE O/P EST MOD 30 MIN: CPT | Mod: 25

## 2023-10-05 LAB
APPEARANCE: CLEAR
BACTERIA: NEGATIVE /HPF
BILIRUBIN URINE: NEGATIVE
BLOOD URINE: NEGATIVE
CAST: 1 /LPF
COLOR: YELLOW
EPITHELIAL CELLS: 1 /HPF
GLUCOSE QUALITATIVE U: NEGATIVE MG/DL
KETONES URINE: ABNORMAL MG/DL
LEUKOCYTE ESTERASE URINE: ABNORMAL
MICROSCOPIC-UA: NORMAL
NITRITE URINE: NEGATIVE
PH URINE: 5
PROTEIN URINE: 100 MG/DL
RED BLOOD CELLS URINE: 0 /HPF
SPECIFIC GRAVITY URINE: 1.02
UROBILINOGEN URINE: 0.2 MG/DL
WHITE BLOOD CELLS URINE: 2 /HPF

## 2023-10-09 LAB — BACTERIA UR CULT: ABNORMAL

## 2023-10-20 ENCOUNTER — APPOINTMENT (OUTPATIENT)
Dept: SURGERY | Facility: CLINIC | Age: 83
End: 2023-10-20
Payer: MEDICARE

## 2023-10-20 VITALS
SYSTOLIC BLOOD PRESSURE: 147 MMHG | WEIGHT: 162 LBS | HEART RATE: 86 BPM | DIASTOLIC BLOOD PRESSURE: 69 MMHG | OXYGEN SATURATION: 96 % | TEMPERATURE: 98.6 F | BODY MASS INDEX: 26.03 KG/M2 | HEIGHT: 66 IN

## 2023-10-20 DIAGNOSIS — I82.401 ACUTE EMBOLISM AND THROMBOSIS OF UNSPECIFIED DEEP VEINS OF RIGHT LOWER EXTREMITY: ICD-10-CM

## 2023-10-20 DIAGNOSIS — E78.00 PURE HYPERCHOLESTEROLEMIA, UNSPECIFIED: ICD-10-CM

## 2023-10-20 DIAGNOSIS — Z98.890 OTHER SPECIFIED POSTPROCEDURAL STATES: ICD-10-CM

## 2023-10-20 DIAGNOSIS — K46.9 UNSPECIFIED ABDOMINAL HERNIA W/OUT OBSTRUCTION OR GANGRENE: ICD-10-CM

## 2023-10-20 PROCEDURE — 99204 OFFICE O/P NEW MOD 45 MIN: CPT

## 2023-10-25 PROBLEM — Z98.890 HISTORY OF HEART SURGERY: Status: ACTIVE | Noted: 2023-10-25

## 2023-10-25 PROBLEM — I82.401 RIGHT LEG DVT: Status: RESOLVED | Noted: 2022-12-15 | Resolved: 2023-10-25

## 2024-01-22 ENCOUNTER — OUTPATIENT (OUTPATIENT)
Dept: OUTPATIENT SERVICES | Facility: HOSPITAL | Age: 84
LOS: 1 days | End: 2024-01-22
Payer: MEDICARE

## 2024-01-22 ENCOUNTER — LABORATORY RESULT (OUTPATIENT)
Age: 84
End: 2024-01-22

## 2024-01-22 DIAGNOSIS — Z98.890 OTHER SPECIFIED POSTPROCEDURAL STATES: Chronic | ICD-10-CM

## 2024-01-22 DIAGNOSIS — Z01.818 ENCOUNTER FOR OTHER PREPROCEDURAL EXAMINATION: ICD-10-CM

## 2024-01-22 DIAGNOSIS — Z90.3 ACQUIRED ABSENCE OF STOMACH [PART OF]: Chronic | ICD-10-CM

## 2024-01-22 DIAGNOSIS — Z94.7 CORNEAL TRANSPLANT STATUS: Chronic | ICD-10-CM

## 2024-01-22 DIAGNOSIS — Z95.1 PRESENCE OF AORTOCORONARY BYPASS GRAFT: Chronic | ICD-10-CM

## 2024-01-22 LAB
ALBUMIN SERPL ELPH-MCNC: 4.4 G/DL
ALP BLD-CCNC: 96 U/L
ALT SERPL-CCNC: 17 U/L
ANION GAP SERPL CALC-SCNC: 12 MMOL/L
AST SERPL-CCNC: 22 U/L
BILIRUB SERPL-MCNC: 0.5 MG/DL
BUN SERPL-MCNC: 22 MG/DL
CALCIUM SERPL-MCNC: 9.4 MG/DL
CHLORIDE SERPL-SCNC: 105 MMOL/L
CO2 SERPL-SCNC: 27 MMOL/L
CREAT SERPL-MCNC: 1.18 MG/DL
EGFR: 61 ML/MIN/1.73M2
GLUCOSE SERPL-MCNC: 130 MG/DL
HCT VFR BLD CALC: 35.8 %
HGB BLD-MCNC: 11.7 G/DL
MCHC RBC-ENTMCNC: 30.9 PG
MCHC RBC-ENTMCNC: 32.7 GM/DL
MCV RBC AUTO: 94.5 FL
PLATELET # BLD AUTO: 157 K/UL
POTASSIUM SERPL-SCNC: 4.5 MMOL/L
PROT SERPL-MCNC: 6.7 G/DL
RBC # BLD: 3.79 M/UL
RBC # FLD: 12.6 %
SODIUM SERPL-SCNC: 143 MMOL/L
WBC # FLD AUTO: 5.18 K/UL

## 2024-01-22 PROCEDURE — 71046 X-RAY EXAM CHEST 2 VIEWS: CPT | Mod: 26

## 2024-01-22 PROCEDURE — 71046 X-RAY EXAM CHEST 2 VIEWS: CPT

## 2024-01-25 LAB
APPEARANCE: CLEAR
APTT BLD: 34.5 SEC
BILIRUBIN URINE: NEGATIVE
BLOOD URINE: NEGATIVE
COLOR: YELLOW
GLUCOSE QUALITATIVE U: NEGATIVE MG/DL
INR PPP: 1.05 RATIO
KETONES URINE: ABNORMAL MG/DL
LEUKOCYTE ESTERASE URINE: ABNORMAL
NITRITE URINE: NEGATIVE
PH URINE: 5.5
PROTEIN URINE: 300 MG/DL
PT BLD: 11.9 SEC
SPECIFIC GRAVITY URINE: 1.02
UROBILINOGEN URINE: 1 MG/DL

## 2024-01-26 VITALS
HEART RATE: 77 BPM | SYSTOLIC BLOOD PRESSURE: 144 MMHG | TEMPERATURE: 98 F | DIASTOLIC BLOOD PRESSURE: 79 MMHG | WEIGHT: 150.58 LBS | RESPIRATION RATE: 16 BRPM | OXYGEN SATURATION: 98 % | HEIGHT: 66 IN

## 2024-01-26 RX ORDER — GABAPENTIN 400 MG/1
1 CAPSULE ORAL
Qty: 0 | Refills: 0 | DISCHARGE

## 2024-01-26 RX ORDER — ACETAMINOPHEN 500 MG
1 TABLET ORAL
Qty: 0 | Refills: 0 | DISCHARGE

## 2024-01-26 RX ORDER — DULAGLUTIDE 4.5 MG/.5ML
0 INJECTION, SOLUTION SUBCUTANEOUS
Qty: 0 | Refills: 0 | DISCHARGE

## 2024-01-26 RX ORDER — DULAGLUTIDE 4.5 MG/.5ML
0.75 INJECTION, SOLUTION SUBCUTANEOUS
Refills: 0 | DISCHARGE

## 2024-01-26 RX ORDER — CLOPIDOGREL BISULFATE 75 MG/1
1 TABLET, FILM COATED ORAL
Qty: 0 | Refills: 0 | DISCHARGE

## 2024-01-26 NOTE — ASU PATIENT PROFILE, ADULT - FALL HARM RISK - HARM RISK INTERVENTIONS

## 2024-01-26 NOTE — ASU PATIENT PROFILE, ADULT - NSICDXPASTMEDICALHX_GEN_ALL_CORE_FT
PAST MEDICAL HISTORY:  CAD (coronary artery disease)     Deep vein thrombosis (DVT) 5/2021 right LE    DM (diabetes mellitus) type 2    HLD (hyperlipidemia)     HTN (hypertension)     Neuropathy     Renal dysfunction     Stomach tumor (benign)

## 2024-01-26 NOTE — ASU PATIENT PROFILE, ADULT - NSICDXPASTSURGICALHX_GEN_ALL_CORE_FT
PAST SURGICAL HISTORY:  H/O colonoscopy     H/O coronary artery bypass surgery 2009    H/O hernia repair right ingunial    History of gastrectomy 1968    Post corneal transplant b/l    S/P eye surgery b/l corneal transplant

## 2024-01-28 ENCOUNTER — TRANSCRIPTION ENCOUNTER (OUTPATIENT)
Age: 84
End: 2024-01-28

## 2024-01-29 ENCOUNTER — TRANSCRIPTION ENCOUNTER (OUTPATIENT)
Age: 84
End: 2024-01-29

## 2024-01-29 ENCOUNTER — OUTPATIENT (OUTPATIENT)
Dept: OUTPATIENT SERVICES | Facility: HOSPITAL | Age: 84
LOS: 1 days | Discharge: HOME | End: 2024-01-29
Payer: MEDICARE

## 2024-01-29 ENCOUNTER — APPOINTMENT (OUTPATIENT)
Dept: SURGERY | Facility: HOSPITAL | Age: 84
End: 2024-01-29

## 2024-01-29 VITALS
DIASTOLIC BLOOD PRESSURE: 65 MMHG | OXYGEN SATURATION: 99 % | HEART RATE: 65 BPM | SYSTOLIC BLOOD PRESSURE: 138 MMHG | RESPIRATION RATE: 15 BRPM

## 2024-01-29 DIAGNOSIS — Z95.1 PRESENCE OF AORTOCORONARY BYPASS GRAFT: Chronic | ICD-10-CM

## 2024-01-29 DIAGNOSIS — Z98.890 OTHER SPECIFIED POSTPROCEDURAL STATES: Chronic | ICD-10-CM

## 2024-01-29 DIAGNOSIS — Z94.7 CORNEAL TRANSPLANT STATUS: Chronic | ICD-10-CM

## 2024-01-29 DIAGNOSIS — Z90.3 ACQUIRED ABSENCE OF STOMACH [PART OF]: Chronic | ICD-10-CM

## 2024-01-29 LAB
GLUCOSE BLDC GLUCOMTR-MCNC: 100 MG/DL — HIGH (ref 70–99)
GLUCOSE BLDC GLUCOMTR-MCNC: 80 MG/DL — SIGNIFICANT CHANGE UP (ref 70–99)
GLUCOSE BLDC GLUCOMTR-MCNC: 81 MG/DL — SIGNIFICANT CHANGE UP (ref 70–99)

## 2024-01-29 PROCEDURE — C1781: CPT

## 2024-01-29 PROCEDURE — 49505 PRP I/HERN INIT REDUC >5 YR: CPT | Mod: RT

## 2024-01-29 PROCEDURE — 49505 PRP I/HERN INIT REDUC >5 YR: CPT | Mod: GC

## 2024-01-29 PROCEDURE — 82962 GLUCOSE BLOOD TEST: CPT

## 2024-01-29 PROCEDURE — C9399: CPT

## 2024-01-29 DEVICE — PLUG AND PATCH PHASIX 1.6X1.3IN MED: Type: IMPLANTABLE DEVICE | Site: RIGHT | Status: FUNCTIONAL

## 2024-01-29 RX ORDER — METFORMIN HYDROCHLORIDE 850 MG/1
1 TABLET ORAL
Qty: 0 | Refills: 0 | DISCHARGE

## 2024-01-29 RX ORDER — FUROSEMIDE 40 MG
1 TABLET ORAL
Qty: 0 | Refills: 0 | DISCHARGE

## 2024-01-29 RX ORDER — FUROSEMIDE 40 MG
1 TABLET ORAL
Refills: 0 | DISCHARGE

## 2024-01-29 RX ORDER — GABAPENTIN 400 MG/1
1 CAPSULE ORAL
Qty: 0 | Refills: 0 | DISCHARGE

## 2024-01-29 RX ORDER — LISINOPRIL 2.5 MG/1
1 TABLET ORAL
Qty: 0 | Refills: 0 | DISCHARGE

## 2024-01-29 RX ORDER — ROSUVASTATIN CALCIUM 5 MG/1
1 TABLET ORAL
Qty: 0 | Refills: 0 | DISCHARGE

## 2024-01-29 RX ORDER — BRIMONIDINE TARTRATE, TIMOLOL MALEATE 2; 5 MG/ML; MG/ML
1 SOLUTION/ DROPS OPHTHALMIC
Refills: 0 | DISCHARGE

## 2024-01-29 RX ORDER — TAMSULOSIN HYDROCHLORIDE 0.4 MG/1
0.4 CAPSULE ORAL ONCE
Refills: 0 | Status: COMPLETED | OUTPATIENT
Start: 2024-01-29 | End: 2024-01-29

## 2024-01-29 RX ORDER — NETARSUDIL 0.2 MG/ML
1 SOLUTION/ DROPS OPHTHALMIC; TOPICAL
Refills: 0 | DISCHARGE

## 2024-01-29 RX ORDER — ONDANSETRON 8 MG/1
4 TABLET, FILM COATED ORAL EVERY 4 HOURS
Refills: 0 | Status: DISCONTINUED | OUTPATIENT
Start: 2024-01-29 | End: 2024-01-30

## 2024-01-29 RX ORDER — ACETAMINOPHEN 500 MG
1000 TABLET ORAL ONCE
Refills: 0 | Status: COMPLETED | OUTPATIENT
Start: 2024-01-29 | End: 2024-01-29

## 2024-01-29 RX ORDER — APIXABAN 2.5 MG/1
1 TABLET, FILM COATED ORAL
Qty: 0 | Refills: 0 | DISCHARGE

## 2024-01-29 RX ORDER — TAMSULOSIN HYDROCHLORIDE 0.4 MG/1
1 CAPSULE ORAL
Refills: 0 | DISCHARGE

## 2024-01-29 RX ORDER — SODIUM CHLORIDE 9 MG/ML
1000 INJECTION, SOLUTION INTRAVENOUS
Refills: 0 | Status: DISCONTINUED | OUTPATIENT
Start: 2024-01-29 | End: 2024-01-30

## 2024-01-29 RX ADMIN — TAMSULOSIN HYDROCHLORIDE 0.4 MILLIGRAM(S): 0.4 CAPSULE ORAL at 12:12

## 2024-01-29 RX ADMIN — SODIUM CHLORIDE 50 MILLILITER(S): 9 INJECTION, SOLUTION INTRAVENOUS at 12:08

## 2024-01-29 RX ADMIN — Medication 400 MILLIGRAM(S): at 19:44

## 2024-01-29 NOTE — BRIEF OPERATIVE NOTE - OPERATION/FINDINGS
Incision created between ASIS and pubic tubercle. Dissection down to external oblique with sharp excision. Findings of fat containing direct inguinal hernia, which was reduced and repaired with plug and patch mesh. Mesh secured to pubic tubercle and shelving edge with running 2-O PDS. External oblique closed with running 2-O vicryl. Raad's closed with interrupted 2-O vicryl. Skin closed with vicryl and monocryl.

## 2024-01-29 NOTE — BRIEF OPERATIVE NOTE - NSICDXBRIEFPROCEDURE_GEN_ALL_CORE_FT
PROCEDURES:  Repair, hernia, inguinal, open, using mesh, adult 29-Jan-2024 11:51:26 Right hernia Mackenzie Cleveland

## 2024-01-29 NOTE — BRIEF OPERATIVE NOTE - NSICDXBRIEFPOSTOP_GEN_ALL_CORE_FT
POST-OP DIAGNOSIS:  Direct inguinal hernia of right side 29-Jan-2024 11:52:07  Mackenzie Cleveland

## 2024-01-29 NOTE — ASU DISCHARGE PLAN (ADULT/PEDIATRIC) - PROVIDER TOKENS
PROVIDER:[TOKEN:[[id number]]] PROVIDER:[TOKEN:[[id number]]],PROVIDER:[TOKEN:[[id number]]]

## 2024-01-29 NOTE — ASU DISCHARGE PLAN (ADULT/PEDIATRIC) - ASU DC SPECIAL INSTRUCTIONSFT
General Discharge Instructions:  Please resume all regular home medications unless specifically advised not to take a particular medication. Also, please take any new medications as prescribed.  Please get plenty of rest, continue to ambulate several times per day, and drink adequate amounts of fluids. Avoid lifting weights greater than 5-10 lbs until you follow-up with your surgeon, who will instruct you further regarding activity restrictions.  Avoid driving or operating heavy machinery while taking pain medications.  Please follow-up with your surgeon and Primary Care Provider (PCP) as advised.  Incision Care:  *Please call your doctor or nurse practitioner if you have increased pain, swelling, redness, or drainage from the incision site.  *Avoid swimming and baths until your follow-up appointment.  *You may shower, and wash surgical incisions with a mild soap and warm water. Gently pat the area dry. General Discharge Instructions:  Please resume all regular home medications unless specifically advised not to take a particular medication. Also, please take any new medications as prescribed.  Please get plenty of rest, continue to ambulate several times per day, and drink adequate amounts of fluids. Avoid lifting weights greater than 5-10 lbs until you follow-up with your surgeon, who will instruct you further regarding activity restrictions.  Avoid driving or operating heavy machinery while taking pain medications.  Please follow-up with your surgeon and Primary Care Provider (PCP) as advised.    Incision Care:  *Please call your doctor or nurse practitioner if you have increased pain, swelling, redness, or drainage from the incision site.  *Avoid swimming and baths until your follow-up appointment.  *You may shower, and wash surgical incisions with a mild soap and warm water. Gently pat the area dry.    You have post-op urinary retention and a kitchen was placed. Please follow up with your urologist Dr. Piña in 1 week. You may call [phone] to schedule your appointment.

## 2024-01-29 NOTE — DISCHARGE NOTE NURSING/CASE MANAGEMENT/SOCIAL WORK - PATIENT PORTAL LINK FT
You can access the FollowMyHealth Patient Portal offered by [facility] by registering at the following website: [email or web address]. By joining [facility]’s FollowMyHealth portal, you will also be able to view your health information using other applications (apps) compatible with our system.

## 2024-01-29 NOTE — ASU DISCHARGE PLAN (ADULT/PEDIATRIC) - CARE PROVIDER_API CALL
RANJITH LEWIS  [address] [place] [id number]  Phone: ()-  Fax: ()-  Follow Up Time:    RANJITH LEWIS  [address] [place] [id number]  Phone: ()-  Fax: ()-  Follow Up Time:     Romeo Piña  Urology  [address]  [place] [address]  Phone: [phone]  Fax: [phone]  Follow Up Time:

## 2024-01-31 PROBLEM — E11.9 TYPE 2 DIABETES MELLITUS WITHOUT COMPLICATIONS: Chronic | Status: ACTIVE | Noted: 2023-01-09

## 2024-01-31 PROBLEM — D13.1 BENIGN NEOPLASM OF STOMACH: Chronic | Status: ACTIVE | Noted: 2024-01-26

## 2024-02-02 ENCOUNTER — APPOINTMENT (OUTPATIENT)
Dept: UROLOGY | Facility: CLINIC | Age: 84
End: 2024-02-02
Payer: MEDICARE

## 2024-02-02 VITALS
WEIGHT: 162 LBS | TEMPERATURE: 96.5 F | DIASTOLIC BLOOD PRESSURE: 61 MMHG | SYSTOLIC BLOOD PRESSURE: 183 MMHG | HEART RATE: 82 BPM | BODY MASS INDEX: 26.03 KG/M2 | HEIGHT: 66 IN

## 2024-02-02 PROCEDURE — 99213 OFFICE O/P EST LOW 20 MIN: CPT | Mod: 25

## 2024-02-02 PROCEDURE — 51798 US URINE CAPACITY MEASURE: CPT

## 2024-02-02 NOTE — HISTORY OF PRESENT ILLNESS
[FreeTextEntry1] : 23: 83 year old man s/p lap colectomy 23 with post-op urinary retention. He was discharged with a kitchen catheter.   No prior episode of urinary retention. He was not taking any prostate medications, but was discharged with prescription for flomax. At baseline, he has weak stream, incomplete emptying, hesitancy and nocturia 2-3 times at night. He is unsure of prior PSAs, last saw urologist about 10-15 years ago. He has family history of prostate cancer in uncle and cousin, both  from prostate cancer. He has aunts and cousins with breast cancer as well. No recent fevers, chills, hematuria, flank pain. He is having regular bowel movements, not taking narcotics.   Bladder filled with 120 cc, patient able to void 110 cc without difficulty. PVR 25 cc  23: Presents for follow up. States he is voiding back to baseline. He has mild urgency and dysuriia that is new. No fevers, chills, hematuria. Not currently taking any prostate medications.  IPSS: patient refused PVR 7 cc UA with small leuks, 100 protein, trace ketones  3/8/23: Returns for follow up. He has not started tadalafil. Has not seen cardiologist to discuss starting the medication. He has ongoing mild dysuria. No fevers, chills, hematuria. Urine culture from last visit grew coag negative staph, so it was not treated at that time.  PVR 3 cc UA with trace ketones, 100 protein, trace leuks  23: Doing well. Took antibiotics for staph epi UTI with resolution of dysuria. Urinary symptoms stable. He was told by PCP not to take tadalafil. Not currently taking BPH medicaiton.  IPSS 23, QOL 2  24: returns for TOV. Recent urinary retention after hernia repair. TOV performed today successful.  PVR 2 cc

## 2024-02-02 NOTE — ASSESSMENT
[FreeTextEntry1] : Patient with post-op urinary retention after hernia repair. Passed void trial today.  - F/U in 2 weeks for PVR

## 2024-02-09 ENCOUNTER — APPOINTMENT (OUTPATIENT)
Dept: SURGERY | Facility: CLINIC | Age: 84
End: 2024-02-09
Payer: MEDICARE

## 2024-02-09 VITALS
SYSTOLIC BLOOD PRESSURE: 152 MMHG | HEIGHT: 66 IN | TEMPERATURE: 97.4 F | DIASTOLIC BLOOD PRESSURE: 66 MMHG | HEART RATE: 74 BPM | OXYGEN SATURATION: 98 % | BODY MASS INDEX: 24.75 KG/M2 | WEIGHT: 154 LBS

## 2024-02-09 PROCEDURE — 99024 POSTOP FOLLOW-UP VISIT: CPT

## 2024-02-14 NOTE — PLAN
[FreeTextEntry1] : IKrysta PA-C, am scribing for and the presence of Dr.Robert Foster the following sections HISTORY OF PRESENT ILLNESSS, PAST MEDICAL/FAMILY/SOCIAL HISTORY; REVIEW OF SYSTEMS; VITAL SIGNS; PHYSICAL EXAM; DISPOSITION.    I, Dr. Foster, personally performed the evaluation and management (E/M) services for this new patient. That E/M includes conducting the clinically appropriate initial history &/or exam, assessing all conditions, and establishing the plan of care. Today, my GOLDIE, RICHAR Calderon, was here to observe my evaluation and management service for this patient & follow plan of care established by me going forward.

## 2024-02-15 NOTE — HISTORY OF PRESENT ILLNESS
[de-identified] : This is a 84 y/o male presenting to the office for evaluation and management of a Right groin bulge. He reports he first noted a bulge in the Right groin x 1-2 years ago. He reports he did not have any symptoms of discomfort/pain until x 4 months ago. He reports x 1 months ago the pain started to occur more frequently which prompted him to seek care. He reports the bulge has increased in size since first noticed. Of note, pt has an extensive surgical history including hx gastrectomy for PUD (1968), corneal transplant, inguinal hernia repair, CABG (2009, currently on plavix and eliquis), Right hemicolectomy [facility] (2023).  [de-identified] : 2-9-24: Patient returns today for a routine post operative visit. Now s/p open Right inguinal hernia repair with mesh on 1/29/24. He experienced post operative urinary retention with placement of kitchen catheter, removed by urology on 2/24/24. He states he is overall feeling well. He is eating and drinking as usual. He states he is having regular bowel movements and voiding as usual. Denies any discomfort or pain. he is ambulating often with no issues. Denies fever, chest pain, shortness of breath, nausea, vomiting or constipation.

## 2024-02-15 NOTE — ASSESSMENT
[FreeTextEntry1] : 85 y/o male s/p open Right inguinal hernia. Patient seems to be doing well post operatively and recovering as expected. Discussed gradual return to normal activity and natural scar maturation. Discussed post operative recommendations of no heavy lifting >10lbs x 6 weeks. RTC 1 month for checkup. All questions answered.   Plan: -RTC 1 month

## 2024-02-15 NOTE — PHYSICAL EXAM
[Oriented to Person] : oriented to person [Oriented to Place] : oriented to place [Oriented to Time] : oriented to time [Calm] : calm [Abdominal Masses] : No abdominal masses [Abdomen Tenderness] : ~T ~M No abdominal tenderness [Tender] : was nontender [Enlarged] : not enlarged [de-identified] : NAD, comfortable [de-identified] : Normocephalic, atraumatic. No scleral icterus.  [de-identified] : Supple, no JVD or cervical lymphadenopathy.  [de-identified] : No respiratory distress [de-identified] : +BS soft, nontender, nondistended.  Incision healing well, c/d/i. No surrounding erythema or induration. Typical healing ridge. Small seroma noted. No evidence of hernia recurrence on exam with valsalva.

## 2024-02-16 ENCOUNTER — APPOINTMENT (OUTPATIENT)
Dept: UROLOGY | Facility: CLINIC | Age: 84
End: 2024-02-16
Payer: MEDICARE

## 2024-02-16 VITALS
WEIGHT: 154 LBS | TEMPERATURE: 97.7 F | SYSTOLIC BLOOD PRESSURE: 142 MMHG | HEART RATE: 74 BPM | BODY MASS INDEX: 24.75 KG/M2 | DIASTOLIC BLOOD PRESSURE: 64 MMHG | HEIGHT: 66 IN

## 2024-02-16 DIAGNOSIS — R33.9 RETENTION OF URINE, UNSPECIFIED: ICD-10-CM

## 2024-02-16 LAB
BILIRUB UR QL STRIP: NORMAL
CLARITY UR: CLEAR
COLLECTION METHOD: NORMAL
GLUCOSE UR-MCNC: NORMAL
HCG UR QL: 0.2 EU/DL
HGB UR QL STRIP.AUTO: ABNORMAL
KETONES UR-MCNC: NORMAL
LEUKOCYTE ESTERASE UR QL STRIP: NORMAL
NITRITE UR QL STRIP: NORMAL
PH UR STRIP: 5
PROT UR STRIP-MCNC: ABNORMAL
SP GR UR STRIP: 1.02

## 2024-02-16 PROCEDURE — 81003 URINALYSIS AUTO W/O SCOPE: CPT | Mod: QW

## 2024-02-16 PROCEDURE — 51798 US URINE CAPACITY MEASURE: CPT

## 2024-02-16 PROCEDURE — 99213 OFFICE O/P EST LOW 20 MIN: CPT | Mod: 25

## 2024-02-16 NOTE — ASSESSMENT
[FreeTextEntry1] : Patient with post-op urinary retention after hernia repair. Passed void trial 2/2/24. Voiding well today. Symptoms back to baseline, which are bothersome. Would consider prostate debulking procedure in the future as he can't take tadalafil and does not like flomax.  - F/U in 3 months for UA, IPSS, PVR

## 2024-02-16 NOTE — PHYSICAL EXAM
[Normal Appearance] : normal appearance [Well Groomed] : well groomed [General Appearance - In No Acute Distress] : no acute distress [Edema] : no peripheral edema [Respiration, Rhythm And Depth] : normal respiratory rhythm and effort [Exaggerated Use Of Accessory Muscles For Inspiration] : no accessory muscle use [Abdomen Soft] : soft [Abdomen Tenderness] : non-tender [Urinary Bladder Findings] : the bladder was normal on palpation [Costovertebral Angle Tenderness] : no ~M costovertebral angle tenderness [Testes Mass (___cm)] : there were no testicular masses [Normal Station and Gait] : the gait and station were normal for the patient's age [] : no rash [No Focal Deficits] : no focal deficits [Oriented To Time, Place, And Person] : oriented to person, place, and time [Affect] : the affect was normal [Mood] : the mood was normal

## 2024-02-16 NOTE — HISTORY OF PRESENT ILLNESS
[FreeTextEntry1] : 23: 83 year old man s/p lap colectomy 23 with post-op urinary retention. He was discharged with a kitchen catheter.   No prior episode of urinary retention. He was not taking any prostate medications, but was discharged with prescription for flomax. At baseline, he has weak stream, incomplete emptying, hesitancy and nocturia 2-3 times at night. He is unsure of prior PSAs, last saw urologist about 10-15 years ago. He has family history of prostate cancer in uncle and cousin, both  from prostate cancer. He has aunts and cousins with breast cancer as well. No recent fevers, chills, hematuria, flank pain. He is having regular bowel movements, not taking narcotics.   Bladder filled with 120 cc, patient able to void 110 cc without difficulty. PVR 25 cc  23: Presents for follow up. States he is voiding back to baseline. He has mild urgency and dysuriia that is new. No fevers, chills, hematuria. Not currently taking any prostate medications.  IPSS: patient refused PVR 7 cc UA with small leuks, 100 protein, trace ketones  3/8/23: Returns for follow up. He has not started tadalafil. Has not seen cardiologist to discuss starting the medication. He has ongoing mild dysuria. No fevers, chills, hematuria. Urine culture from last visit grew coag negative staph, so it was not treated at that time.  PVR 3 cc UA with trace ketones, 100 protein, trace leuks  23: Doing well. Took antibiotics for staph epi UTI with resolution of dysuria. Urinary symptoms stable. He was told by PCP not to take tadalafil. Not currently taking BPH medicaiton.  IPSS 23, QOL 2  24: returns for TOV. Recent urinary retention after hernia repair. TOV performed today successful.  PVR 2 cc  24: Doing well. Voiding back to baseline. Not taking BPH medications. Can't take tadalafil at PCPs request and does not like retrograde ejaculation from flomax.  IPSS 10, PVR 11 cc

## 2024-02-21 LAB
APPEARANCE: CLEAR
BACTERIA UR CULT: NORMAL
BACTERIA: NEGATIVE /HPF
BILIRUBIN URINE: NEGATIVE
BLOOD URINE: NEGATIVE
CAST: 6 /LPF
COLOR: YELLOW
EPITHELIAL CELLS: 1 /HPF
GLUCOSE QUALITATIVE U: NEGATIVE MG/DL
HYALINE CASTS: PRESENT
KETONES URINE: NEGATIVE MG/DL
LEUKOCYTE ESTERASE URINE: NEGATIVE
MICROSCOPIC-UA: NORMAL
NITRITE URINE: NEGATIVE
PH URINE: 5.5
PROTEIN URINE: 30 MG/DL
RED BLOOD CELLS URINE: 1 /HPF
REVIEW: NORMAL
SPECIFIC GRAVITY URINE: 1.01
UROBILINOGEN URINE: 0.2 MG/DL
WHITE BLOOD CELLS URINE: 0 /HPF

## 2024-03-08 ENCOUNTER — APPOINTMENT (OUTPATIENT)
Dept: SURGERY | Facility: CLINIC | Age: 84
End: 2024-03-08
Payer: MEDICARE

## 2024-03-08 ENCOUNTER — LABORATORY RESULT (OUTPATIENT)
Age: 84
End: 2024-03-08

## 2024-03-08 VITALS
SYSTOLIC BLOOD PRESSURE: 121 MMHG | DIASTOLIC BLOOD PRESSURE: 71 MMHG | BODY MASS INDEX: 24.11 KG/M2 | HEIGHT: 66 IN | HEART RATE: 85 BPM | OXYGEN SATURATION: 99 % | WEIGHT: 150 LBS | TEMPERATURE: 97.4 F

## 2024-03-08 DIAGNOSIS — K40.90 UNILATERAL INGUINAL HERNIA, W/OUT OBSTRUCTION OR GANGRENE, NOT SPECIFIED AS RECURRENT: ICD-10-CM

## 2024-03-08 PROCEDURE — 99024 POSTOP FOLLOW-UP VISIT: CPT

## 2024-03-11 PROBLEM — K40.90 RIGHT INGUINAL HERNIA: Status: ACTIVE | Noted: 2023-10-25

## 2024-03-11 LAB
APPEARANCE: CLEAR
BILIRUBIN URINE: NEGATIVE
BLOOD URINE: NEGATIVE
COLOR: YELLOW
GLUCOSE QUALITATIVE U: NEGATIVE MG/DL
KETONES URINE: ABNORMAL MG/DL
LEUKOCYTE ESTERASE URINE: ABNORMAL
NITRITE URINE: NEGATIVE
PH URINE: 5.5
PROTEIN URINE: 100 MG/DL
SPECIFIC GRAVITY URINE: 1.02
UROBILINOGEN URINE: 1 MG/DL

## 2024-03-11 RX ORDER — SULFAMETHOXAZOLE AND TRIMETHOPRIM 800; 160 MG/1; MG/1
800-160 TABLET ORAL TWICE DAILY
Qty: 10 | Refills: 0 | Status: ACTIVE | COMMUNITY
Start: 2024-03-11 | End: 1900-01-01

## 2024-03-11 NOTE — HISTORY OF PRESENT ILLNESS
[de-identified] : This is a 82 y/o male presenting to the office for evaluation and management of a Right groin bulge. He reports he first noted a bulge in the Right groin x 1-2 years ago. He reports he did not have any symptoms of discomfort/pain until x 4 months ago. He reports x 1 months ago the pain started to occur more frequently which prompted him to seek care. He reports the bulge has increased in size since first noticed. 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  [de-identified] : 2-9-24: Patient returns today for a routine post operative visit. Now s/p open Right inguinal hernia repair with mesh on 1/29/24. He experienced post operative urinary retention with placement of kitchen catheter, removed by urology on 2/24/24. He states he is overall feeling well. He is eating and drinking as usual. He states he is having regular bowel movements and voiding as usual. Denies any discomfort or pain. he is ambulating often with no issues. Denies fever, chest pain, shortness of breath, nausea, vomiting or constipation.   3-8-24: Returns to office. He states he is overall feeling well. He states that he is eating and drinking as usual. He is having daily bowel movements with no issues. He reports he is having discomfort with voiding, denies any burning sensation or increases frequency. Otherwise, pt reports he is overall feeling well. He does not have any pain or discomfort. Denies fever, chest pain, shortness of breath, nausea, vomiting or constipation.

## 2024-03-11 NOTE — PHYSICAL EXAM
[Abdominal Masses] : No abdominal masses [Abdomen Tenderness] : ~T ~M No abdominal tenderness [Tender] : was nontender [Enlarged] : not enlarged [Oriented to Person] : oriented to person [Oriented to Place] : oriented to place [Calm] : calm [Oriented to Time] : oriented to time [de-identified] : NAD, comfortable [de-identified] : Normocephalic, atraumatic. No scleral icterus.  [de-identified] : Supple, no JVD or cervical lymphadenopathy.  [de-identified] : No respiratory distress [de-identified] : +BS soft, nontender, nondistended.  Incision healing well, c/d/i. No surrounding erythema or induration. Typical healing ridge. Small seroma noted, improving from last visit. No evidence of hernia recurrence on exam with valsalva.

## 2024-03-11 NOTE — ASSESSMENT
[FreeTextEntry1] : 85 y/o male s/p open Right inguinal hernia. He is doing well and recovering as expected. Having dysuria, discussed plan for urinalysis to further evaluate. Will call pt with results. All questions answered.    Plan: -RTC 6 months

## 2024-05-17 ENCOUNTER — APPOINTMENT (OUTPATIENT)
Dept: UROLOGY | Facility: CLINIC | Age: 84
End: 2024-05-17
Payer: MEDICARE

## 2024-05-17 VITALS
HEART RATE: 76 BPM | WEIGHT: 150 LBS | BODY MASS INDEX: 24.11 KG/M2 | HEIGHT: 66 IN | DIASTOLIC BLOOD PRESSURE: 57 MMHG | TEMPERATURE: 98.3 F | SYSTOLIC BLOOD PRESSURE: 97 MMHG

## 2024-05-17 DIAGNOSIS — R30.0 DYSURIA: ICD-10-CM

## 2024-05-17 DIAGNOSIS — R39.9 UNSPECIFIED SYMPTOMS AND SIGNS INVOLVING THE GENITOURINARY SYSTEM: ICD-10-CM

## 2024-05-17 PROCEDURE — 99214 OFFICE O/P EST MOD 30 MIN: CPT

## 2024-05-17 NOTE — ASSESSMENT
[FreeTextEntry1] : Patient with post-op urinary retention after hernia repair 1/2024. Passed void trial 2/2/24. Voiding well today. Symptoms back to baseline, which are bothersome. Would consider prostate debulking procedure in the future as he can't take tadalafil and does not like flomax. At this time, will continue to monitor. Recent UTI likely related to prior catheterization. Given persistent dysuria, will repeat urine culture. If still positive, plan for 10 day course of antibiotics.   - F/U Urine culture  - F/U in 6 months for UA, IPSS, PVR

## 2024-05-17 NOTE — PHYSICAL EXAM
[Normal Appearance] : normal appearance [Well Groomed] : well groomed [General Appearance - In No Acute Distress] : no acute distress [Edema] : no peripheral edema [Respiration, Rhythm And Depth] : normal respiratory rhythm and effort [Exaggerated Use Of Accessory Muscles For Inspiration] : no accessory muscle use [Abdomen Soft] : soft [Abdomen Tenderness] : non-tender [Costovertebral Angle Tenderness] : no ~M costovertebral angle tenderness [Urethral Meatus] : meatus normal [Urinary Bladder Findings] : the bladder was normal on palpation [Testes Mass (___cm)] : there were no testicular masses [Normal Station and Gait] : the gait and station were normal for the patient's age [] : no rash [No Focal Deficits] : no focal deficits [Oriented To Time, Place, And Person] : oriented to person, place, and time [Affect] : the affect was normal [Mood] : the mood was normal

## 2024-05-17 NOTE — HISTORY OF PRESENT ILLNESS
[FreeTextEntry1] : 23: 83 year old man s/p lap colectomy 23 with post-op urinary retention. He was discharged with a kitchen catheter.   No prior episode of urinary retention. He was not taking any prostate medications, but was discharged with prescription for flomax. At baseline, he has weak stream, incomplete emptying, hesitancy and nocturia 2-3 times at night. He is unsure of prior PSAs, last saw urologist about 10-15 years ago. He has family history of prostate cancer in uncle and cousin, both  from prostate cancer. He has aunts and cousins with breast cancer as well. No recent fevers, chills, hematuria, flank pain. He is having regular bowel movements, not taking narcotics.   Bladder filled with 120 cc, patient able to void 110 cc without difficulty. PVR 25 cc  23: Presents for follow up. States he is voiding back to baseline. He has mild urgency and dysuriia that is new. No fevers, chills, hematuria. Not currently taking any prostate medications.  IPSS: patient refused PVR 7 cc UA with small leuks, 100 protein, trace ketones  3/8/23: Returns for follow up. He has not started tadalafil. Has not seen cardiologist to discuss starting the medication. He has ongoing mild dysuria. No fevers, chills, hematuria. Urine culture from last visit grew coag negative staph, so it was not treated at that time.  PVR 3 cc UA with trace ketones, 100 protein, trace leuks  23: Doing well. Took antibiotics for staph epi UTI with resolution of dysuria. Urinary symptoms stable. He was told by PCP not to take tadalafil. Not currently taking BPH medicaiton.  IPSS 23, QOL 2  24: returns for TOV. Recent urinary retention after hernia repair. TOV performed today successful.  PVR 2 cc  24: Doing well. Voiding back to baseline. Not taking BPH medications. Can't take tadalafil at PCPs request and does not like retrograde ejaculation from flomax.  IPSS 10, PVR 11 cc  24: Doing well. Urinary symptoms at baseline. Recent dysuria, found to have 10-50 K coag negative staph. Received antibiotics with improvement, though he still has mild dysuria, no fevers, chills, hematuria.  63

## 2024-05-21 LAB
APPEARANCE: ABNORMAL
BACTERIA UR CULT: NORMAL
BACTERIA: ABNORMAL /HPF
BILIRUBIN URINE: NEGATIVE
BLOOD URINE: NEGATIVE
CAST: 3 /LPF
COLOR: YELLOW
EPITHELIAL CELLS: 4 /HPF
GLUCOSE QUALITATIVE U: 100 MG/DL
KETONES URINE: ABNORMAL MG/DL
LEUKOCYTE ESTERASE URINE: ABNORMAL
MICROSCOPIC-UA: NORMAL
NITRITE URINE: NEGATIVE
PH URINE: 5.5
PROTEIN URINE: 100 MG/DL
RED BLOOD CELLS URINE: 0 /HPF
SPECIFIC GRAVITY URINE: 1.02
UROBILINOGEN URINE: 0.2 MG/DL
WHITE BLOOD CELLS URINE: 83 /HPF

## 2024-06-12 ENCOUNTER — APPOINTMENT (OUTPATIENT)
Dept: ORTHOPEDIC SURGERY | Facility: CLINIC | Age: 84
End: 2024-06-12
Payer: MEDICARE

## 2024-06-12 VITALS — BODY MASS INDEX: 24.11 KG/M2 | RESPIRATION RATE: 16 BRPM | HEIGHT: 66 IN | WEIGHT: 150 LBS

## 2024-06-12 DIAGNOSIS — M65.332 TRIGGER FINGER, LEFT MIDDLE FINGER: ICD-10-CM

## 2024-06-12 DIAGNOSIS — G56.03 CARPAL TUNNEL SYNDROM,BILATERAL UPPER LIMBS: ICD-10-CM

## 2024-06-12 DIAGNOSIS — M65.321 TRIGGER FINGER, RIGHT INDEX FINGER: ICD-10-CM

## 2024-06-12 DIAGNOSIS — M65.322 TRIGGER FINGER, LEFT INDEX FINGER: ICD-10-CM

## 2024-06-12 DIAGNOSIS — M65.331 TRIGGER FINGER, RIGHT MIDDLE FINGER: ICD-10-CM

## 2024-06-12 PROCEDURE — 73130 X-RAY EXAM OF HAND: CPT | Mod: 50

## 2024-06-12 PROCEDURE — 99205 OFFICE O/P NEW HI 60 MIN: CPT | Mod: 25

## 2024-06-12 PROCEDURE — 20550 NJX 1 TENDON SHEATH/LIGAMENT: CPT | Mod: F6,F7

## 2024-06-12 NOTE — REASON FOR VISIT
[Initial Consultation] : an initial consultation for [Carpal Tunnel Syndrome] : Carpal Tunnel Syndrome [Trigger Finger] : trigger finger

## 2024-06-12 NOTE — PHYSICAL EXAM
[de-identified] : Physical exam shows the patient to be alert and oriented x3, capable of ambulation. The patient is well-developed and well-nourished in no apparent respiratory distress. Majority of the skin is intact bilaterally in the upper extremities without lymphadenopathy at the elbows.  There is a negative Spurling test. There is no sensitivity or Tinel's over the axilla bilaterally in the area of the brachial plexus.  The elbows have a symmetric and full range of motion with no pain upon forced flexion or extension bilaterally. There is no tenderness over the medial or lateral epicondyles bilaterally. The biceps and triceps are intact bilaterally with 5 over 5 strength. There is no joint effusion or instability of the medial and lateral collateral ligament complex bilaterally. There is no sensitivity of the median ulnar or radial nerves with provocative tests bilaterally.  The wrists have a symmetric range of motion bilaterally. There is no tenderness over the scaphoid, scapholunate or lunotriquetral ligaments bilaterally. There is a negative Infante test bilaterally. There is negative tenderness over the radial ulnar joint or TFCC and no evidence of instability bilaterally. No tenderness over the pisotriquetral or hamate hook or CMC joint bilaterally. There is 5 over 5 strength of the wrists bilaterally.  There is a negative Finkelstein test bilaterally. There is no tenderness or instability of the MP PIP or DIP joints in the digits bilaterally with no evidence of digital malrotation. There is tenderness over the right second and third and left second and third A1 pulleys.  Upon making a fist the fourth and fifth digits touch the palm but the second and third digits are 3 cm from the palm passively correctable to 1 cm. There is no sensitivity or masses around the ulnar nerve at the level of the wrist bilaterally.   There is 2+ pulses over the radial arteries bilaterally with good capillary refill.  There is a positive Tinel's and a positive Phalen's test at 15 seconds on the bilateral. There is also thenar atrophy but good opposition is present. The remaining intrinsic musculature has good strength bilaterally.  Sensation is diminished in the median nerve distribution on the affected side. Ulnar nerve sensation is grossly intact [de-identified] : PA lateral and obliques of both hands shows degenerative changes with sclerosis and osteophytes of the DIP joints but the radiocarpal and midcarpal joints are relatively well-preserved.

## 2024-06-12 NOTE — ASSESSMENT
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  His chief complaint appears to be a second and third bilateral trigger fingers with significant stiffness as well as chronic carpal tunnel syndrome superimposed on diabetes.  Possible polyneuropathy or double crush phenomenon.  Differential diagnosis was discussed as well as options ranging conservative management, therapy, splinting, injections, or further workup.  Risk associated with each option discussed and all questions answered.  He elected to proceed with an injection for the right second and third trigger fingers.  A home program was outlined as well as splinting and referral to outside therapy.  Return to the office in 4 to 6 weeks to assess the degree of success of conservative management.  If symptoms resolve he can follow on an as-needed basis.

## 2024-06-12 NOTE — CONSULT LETTER
[Dear  ___] : Dear  [unfilled], [Consult Letter:] : I had the pleasure of evaluating your patient, [unfilled]. [Please see my note below.] : Please see my note below. [Consult Closing:] : Thank you very much for allowing me to participate in the care of this patient.  If you have any questions, please do not hesitate to contact me. [Sincerely,] : Sincerely, [FreeTextEntry3] : Eric Tejeda M.D., FAAOS Co-Director [facility]

## 2024-06-12 NOTE — HISTORY OF PRESENT ILLNESS
[Right] : right hand dominant [FreeTextEntry1] : Patient here for bilateral median nerve numbness for approximately 1 year which patient describes as continuous.  Patient does not have splints for nighttime symptoms.  Patient also has swelling in fingers 2 through 5 bilaterally for approximately 5 to 6 years with with no treatment except squeezing a ball.  Patient is tender over the A1 pulleys of bilateral second and third.  Patient is unable to make a fist.

## 2024-07-05 ENCOUNTER — APPOINTMENT (OUTPATIENT)
Dept: UROLOGY | Facility: CLINIC | Age: 84
End: 2024-07-05

## 2024-07-24 ENCOUNTER — APPOINTMENT (OUTPATIENT)
Dept: ORTHOPEDIC SURGERY | Facility: CLINIC | Age: 84
End: 2024-07-24
Payer: MEDICARE

## 2024-07-24 VITALS — BODY MASS INDEX: 24.11 KG/M2 | WEIGHT: 150 LBS | RESPIRATION RATE: 16 BRPM | HEIGHT: 66 IN

## 2024-07-24 DIAGNOSIS — M65.332 TRIGGER FINGER, LEFT MIDDLE FINGER: ICD-10-CM

## 2024-07-24 DIAGNOSIS — M65.322 TRIGGER FINGER, LEFT INDEX FINGER: ICD-10-CM

## 2024-07-24 DIAGNOSIS — G56.03 CARPAL TUNNEL SYNDROM,BILATERAL UPPER LIMBS: ICD-10-CM

## 2024-07-24 PROCEDURE — 20550 NJX 1 TENDON SHEATH/LIGAMENT: CPT | Mod: F1,F2

## 2024-07-24 PROCEDURE — 99214 OFFICE O/P EST MOD 30 MIN: CPT | Mod: 25

## 2024-07-24 NOTE — ASSESSMENT
[FreeTextEntry1] : Patient has responded well from the previous injections for the trigger fingers on the right hand.  Patient is having left second and third trigger fingers with associated stiffness  Bilateral carpal tunnel syndrome.  The risks, benefits, alternatives and associated differential diagnosis was discussed with the patient. Options ranged from conservative care, therapy to surgical intervention were reviewed and all questions answered. Risks included incomplete resolution of symptoms, worsening of symptoms recurrence, tissue loss, functional loss and other risks associated with treatment of this condition Patient appeared to have an excellent understanding of the risks as well as differential diagnosis associated with this condition.  He elected to proceed with an injection for the left second and third trigger fingers  After the area was cleaned with alcohol to reduce the risk of infection, a half a cc of Kenalog 10-1/2 a cc of 2% lidocaine was placed into the region. Hemostasis was obtained by direct pressure and a Band-Aid applied.  Left second and third tendon sheath  He wants to try therapy and splinting for the bilateral carpal tunnel syndrome and if this is not resolved we will proceed with electrodiagnostic studies and based on results further treatment will be considered.  If symptoms resolve he can follow on an as-needed basis.

## 2024-07-24 NOTE — HISTORY OF PRESENT ILLNESS
[Right] : right hand dominant [FreeTextEntry1] : Patient here for left index enteric trigger fingers which were not injected at last visit.  Patient had the right index and third fingers injected and doing well and would like to left third and index injected today.

## 2024-07-24 NOTE — REASON FOR VISIT
[Follow-Up Visit] : a follow-up visit for [Carpal Tunnel Syndrome] : Carpal Tunnel Syndrome [Trigger Finger] : trigger finger

## 2024-07-24 NOTE — PHYSICAL EXAM
[de-identified] : No skin changes or atrophy from previous injection and the right hand is moving well without crepitus or instability.  There is tenderness over the A1 pulley of the left second and third digits and upon making a fist he is 3 cm from the palm passively correctable to 1 cm.  The fourth and fifth A1 pulleys are nontender and touch the palm.  No tenderness over the MP PIP or DIP joint. Good opposition but thenar atrophy Positive Tinel's and Phalen's at 15 seconds on the right 20 seconds on the left

## 2024-09-06 ENCOUNTER — APPOINTMENT (OUTPATIENT)
Dept: SURGERY | Facility: CLINIC | Age: 84
End: 2024-09-06

## 2024-09-20 ENCOUNTER — APPOINTMENT (OUTPATIENT)
Dept: ORTHOPEDIC SURGERY | Facility: CLINIC | Age: 84
End: 2024-09-20
Payer: MEDICARE

## 2024-09-20 DIAGNOSIS — M65.332 TRIGGER FINGER, LEFT MIDDLE FINGER: ICD-10-CM

## 2024-09-20 DIAGNOSIS — M65.322 TRIGGER FINGER, LEFT INDEX FINGER: ICD-10-CM

## 2024-09-20 DIAGNOSIS — G56.03 CARPAL TUNNEL SYNDROM,BILATERAL UPPER LIMBS: ICD-10-CM

## 2024-09-20 PROCEDURE — 99214 OFFICE O/P EST MOD 30 MIN: CPT | Mod: 25

## 2024-09-20 NOTE — ASSESSMENT
[FreeTextEntry1] : Bilateral carpal tunnel syndrome and left second and third trigger fingers which did not resolve despite rest activity modification splinting and injections.  Differential diagnosis was discussed as well as options ranging conservative management, further workup therapy, splinting, injection, or surgical decompression.  Risk associate with each option discussed and all questions answered  Patient would like to proceed with endoscopic less likely open left carpal tunnel release and left second and third trigger finger releases.

## 2024-09-20 NOTE — PHYSICAL EXAM
[de-identified] : Physical exam shows the patient to be alert and oriented x3, capable of ambulation. The patient is well-developed and well-nourished in no apparent respiratory distress. Majority of the skin is intact bilaterally in the upper extremities without lymphadenopathy at the elbows.  There is a negative Spurling test. There is no sensitivity or Tinel's over the axilla bilaterally in the area of the brachial plexus.  The elbows have a symmetric and full range of motion with no pain upon forced flexion or extension bilaterally. There is no tenderness over the medial or lateral epicondyles bilaterally. The biceps and triceps are intact bilaterally with 5 over 5 strength. There is no joint effusion or instability of the medial and lateral collateral ligament complex bilaterally. There is no sensitivity of the median ulnar or radial nerves with provocative tests bilaterally.  The wrists have a symmetric range of motion bilaterally. There is no tenderness over the scaphoid, scapholunate or lunotriquetral ligaments bilaterally. There is a negative Infante test bilaterally. There is negative tenderness over the radial ulnar joint or TFCC and no evidence of instability bilaterally. No tenderness over the pisotriquetral or hamate hook or CMC joint bilaterally. There is 5 over 5 strength of the wrists bilaterally. Tenderness over the A1 pulley of the left second and third digits without locking upon compression no tenderness of the MP PIP or DIP joints.  Upon making a fist he is 3 cm from the palm passively correctable to 1 cm. Mild thenar atrophy bilaterally but good opposition.  Positive Tinel's and Phalen's over the median nerve bilaterally but no sensitivity over the radial ulnar median nerves level of the axilla or elbow and negative Spurling test.

## 2024-09-20 NOTE — HISTORY OF PRESENT ILLNESS
[Right] : right hand dominant [FreeTextEntry1] : Patient is following up for his bilateral carpal tunnel syndrome. Patient opted for conservative treatment of physical therapy and splinting on his last visit in July. he complains of bilateral numbness and mild tingling  Patient was last seen on 07/24/2024 and had the left second and third trigger fingers injected with minimal relief. He is still going for PT twice a week. He states that the left second trigger finger is not able to close fully and feels stiff.

## 2024-10-07 ENCOUNTER — TRANSCRIPTION ENCOUNTER (OUTPATIENT)
Age: 84
End: 2024-10-07

## 2024-10-07 NOTE — BRIEF OPERATIVE NOTE - NSICDXBRIEFPROCEDURE_GEN_ALL_CORE_FT
PROCEDURES:  Endoscopic carpal tunnel release 07-Oct-2024 07:38:07  Art Mckeon  Release of trigger finger of left index finger 07-Oct-2024 07:38:18  Art Mckeon  Release of trigger finger of left middle finger 07-Oct-2024 07:38:27  Art Mckeon

## 2024-10-07 NOTE — BRIEF OPERATIVE NOTE - NSICDXBRIEFPREOP_GEN_ALL_CORE_FT
PRE-OP DIAGNOSIS:  Left carpal tunnel syndrome 07-Oct-2024 07:38:40  Art Mckeon  Trigger finger, left index finger 07-Oct-2024 07:38:50  Art Mckeon  Trigger finger, left middle finger 07-Oct-2024 07:38:58  Art Mckeon

## 2024-10-07 NOTE — ASU DISCHARGE PLAN (ADULT/PEDIATRIC) - CARE PROVIDER_API CALL
Eric Tejeda  [facility]  [address]  [place] [address]  Phone: [phone]  Fax: [phone]  Established Patient  Follow Up Time:

## 2024-10-07 NOTE — BRIEF OPERATIVE NOTE - NSICDXBRIEFPOSTOP_GEN_ALL_CORE_FT
POST-OP DIAGNOSIS:  Left carpal tunnel syndrome 07-Oct-2024 07:39:09  Art Mckeon  Trigger finger, left middle finger 07-Oct-2024 07:39:18  Art Mckeon  Trigger finger, left index finger 07-Oct-2024 07:39:27  Art Mckeon

## 2024-10-07 NOTE — ASU DISCHARGE PLAN (ADULT/PEDIATRIC) - ASU DC SPECIAL INSTRUCTIONSFT
Co-Directors: Eric Tejeda MD; Joe Doan MD; Doug Price MD   [facility] of [address] Floor 	  [place] [address] 	 	  Phone [phone] (HAND), Fax [phone]    [email or web address]    Hand Surgery Post Operative Instructions      DRESSING CARE:  1.	Please keep bandage ON and DRY until you return to the office for your 1st postoperative visit.   2.	In the shower you must cover bandage with a plastic bag. You can use tape or a rubber band so no water leaks into the bag.   3.	Please do not exercise as that leads to excessive sweating as the bandage will therefore become moist/ wet.    4.	Do not remove or change your bandage. You may apply more tape if dressing starts to unravel.   5.	Please do not insert any objects, such as a pencil, down into the bandage.     ELEVATION:  1.	Keep hand/wrist above heart level at all times or until bandage feels loose. This will help with swelling of the fingers and can be accomplished by using the FOAM PILLOW.   2.	 A sling will not hold your hand/wrist above your heart and therefore its use should be limited (it may also cause shoulder stiffness).     ACTIVITY:  1.	Moving your fingers daily after surgery is very important to prevent stiffness. Please open your fingers completely and close your fingers completely to achieve full range of motion.  **UNLESS TENDON REPAIR OR NERVE REPAIR SURGERY PERFORMED    2.	Move all joints of the extremity that are not immobilized to prevent stiffness (i.e. shoulder, elbow, fingers, and thumb unless instructed otherwise).   3.	Avoid activities which may re-injure your hand or finger.     DIET:   Regular diet. Start light and progress as tolerated.     PAIN MEDICINE:   1.	Pain medicine was sent to your pharmacy.  2.	Take pain medicine on an “AS NEEDED” basis according to your doctor’s instructions.   3.	Your pain will decrease over the next few days allowing you to:   •	Decrease your pain medicine quantity until you stop.   •	Increase the time between doses until you stop.   4.	You should not drink alcoholic beverages while on pain medication.   5.	Take pain medicine with food to prevent nausea.   6.	Constipation can occur. If no bowel movement occurs within 48 hours take a laxative of your choice (over the counter).	 	      CONTACT PHYSICIAN FOR:   Slight pain, swelling and bluish discoloration are to be expected. If you have breathing difficulty or chest pain dial 911 immediately. However, if the following symptoms occur notify your physician:   •	Temperature above 101° F 	        • Inability to urinate in 8 hours   •	Uncontrolled nausea/vomiting   	• Progressively increasing pain   •	Signs of wound infection 	                • Excessive bleeding  (Redness, swelling, pus-like drainage)     • Increasing numbness   •	Excessive swelling and tightness 	• Splint or cast that is too tight      OFFICE APPOINTMENT:    A staff member from the office will call you in the next 1-2 days to schedule your 1st Post Operative appointment to see your physician back in the office. *IF someone does not reach out to you in the next 1-2 days please call the office.      Patient Name _________________________________ Signature ______________________________ Date__________    Witness _____________________________________________________ Date ______________

## 2024-10-08 RX ORDER — OXYCODONE AND ACETAMINOPHEN 5; 325 MG/1; MG/1
5-325 TABLET ORAL
Qty: 15 | Refills: 0 | Status: ACTIVE | COMMUNITY
Start: 2024-10-08 | End: 1900-01-01

## 2024-10-08 RX ORDER — CHLORHEXIDINE GLUCONATE ORAL RINSE 1.2 MG/ML
1 SOLUTION DENTAL DAILY
Refills: 0 | Status: DISCONTINUED | OUTPATIENT
Start: 2024-10-09 | End: 2024-10-09

## 2024-10-08 NOTE — ASU PATIENT PROFILE, ADULT - NS PREOP UNDERSTANDS INFO
Arrival time and NPO status as per MD's office/ photo ID and insurance card/ comfortable clothing/ family  will take him home/yes

## 2024-10-09 ENCOUNTER — APPOINTMENT (OUTPATIENT)
Dept: ORTHOPEDIC SURGERY | Facility: AMBULATORY SURGERY CENTER | Age: 84
End: 2024-10-09

## 2024-10-09 ENCOUNTER — OUTPATIENT (OUTPATIENT)
Dept: OUTPATIENT SERVICES | Facility: HOSPITAL | Age: 84
LOS: 1 days | Discharge: HOME | End: 2024-10-09

## 2024-10-09 ENCOUNTER — TRANSCRIPTION ENCOUNTER (OUTPATIENT)
Age: 84
End: 2024-10-09

## 2024-10-09 VITALS
RESPIRATION RATE: 16 BRPM | SYSTOLIC BLOOD PRESSURE: 140 MMHG | HEIGHT: 66 IN | OXYGEN SATURATION: 100 % | HEART RATE: 67 BPM | DIASTOLIC BLOOD PRESSURE: 56 MMHG | WEIGHT: 155.21 LBS | TEMPERATURE: 99 F

## 2024-10-09 VITALS
HEART RATE: 65 BPM | DIASTOLIC BLOOD PRESSURE: 56 MMHG | OXYGEN SATURATION: 99 % | RESPIRATION RATE: 16 BRPM | SYSTOLIC BLOOD PRESSURE: 141 MMHG | TEMPERATURE: 98 F

## 2024-10-09 DIAGNOSIS — Z90.3 ACQUIRED ABSENCE OF STOMACH [PART OF]: Chronic | ICD-10-CM

## 2024-10-09 DIAGNOSIS — Z98.890 OTHER SPECIFIED POSTPROCEDURAL STATES: Chronic | ICD-10-CM

## 2024-10-09 DIAGNOSIS — Z94.7 CORNEAL TRANSPLANT STATUS: Chronic | ICD-10-CM

## 2024-10-09 DIAGNOSIS — Z95.1 PRESENCE OF AORTOCORONARY BYPASS GRAFT: Chronic | ICD-10-CM

## 2024-10-09 LAB — GLUCOSE BLDC GLUCOMTR-MCNC: 130 MG/DL — HIGH (ref 70–99)

## 2024-10-09 PROCEDURE — 64721 CARPAL TUNNEL SURGERY: CPT | Mod: LT

## 2024-10-09 PROCEDURE — 26055 INCISE FINGER TENDON SHEATH: CPT | Mod: F2

## 2024-10-09 PROCEDURE — 26180 REMOVAL OF FINGER TENDON: CPT | Mod: F1

## 2024-10-09 RX ORDER — FENTANYL CITRATE-0.9 % NACL/PF 300MCG/30
25 PATIENT CONTROLLED ANALGESIA VIAL INJECTION
Refills: 0 | Status: DISCONTINUED | OUTPATIENT
Start: 2024-10-09 | End: 2024-10-09

## 2024-10-09 RX ORDER — SODIUM CHLORIDE IRRIG SOLUTION 0.9 %
1000 SOLUTION, IRRIGATION IRRIGATION
Refills: 0 | Status: DISCONTINUED | OUTPATIENT
Start: 2024-10-09 | End: 2024-10-09

## 2024-10-09 NOTE — ASU PREOP CHECKLIST - ASSESSMENT, HISTORY & PHYSICAL COMPLETED AND ON MEDICAL RECORD
Include Location In Plan?: Yes
Hide Include Location In Plan Question?: No
Detail Level: Simple
done

## 2024-10-11 ENCOUNTER — APPOINTMENT (OUTPATIENT)
Dept: SURGERY | Facility: CLINIC | Age: 84
End: 2024-10-11
Payer: MEDICARE

## 2024-10-11 VITALS
SYSTOLIC BLOOD PRESSURE: 112 MMHG | DIASTOLIC BLOOD PRESSURE: 65 MMHG | HEIGHT: 66 IN | TEMPERATURE: 98.4 F | BODY MASS INDEX: 25.55 KG/M2 | WEIGHT: 159 LBS | OXYGEN SATURATION: 98 % | HEART RATE: 68 BPM

## 2024-10-11 DIAGNOSIS — K40.90 UNILATERAL INGUINAL HERNIA, W/OUT OBSTRUCTION OR GANGRENE, NOT SPECIFIED AS RECURRENT: ICD-10-CM

## 2024-10-11 PROCEDURE — 99212 OFFICE O/P EST SF 10 MIN: CPT

## 2024-10-21 ENCOUNTER — APPOINTMENT (OUTPATIENT)
Dept: ORTHOPEDIC SURGERY | Facility: CLINIC | Age: 84
End: 2024-10-21
Payer: MEDICARE

## 2024-10-21 DIAGNOSIS — G56.03 CARPAL TUNNEL SYNDROM,BILATERAL UPPER LIMBS: ICD-10-CM

## 2024-10-21 DIAGNOSIS — M65.332 TRIGGER FINGER, LEFT MIDDLE FINGER: ICD-10-CM

## 2024-10-21 DIAGNOSIS — M65.322 TRIGGER FINGER, LEFT INDEX FINGER: ICD-10-CM

## 2024-10-21 PROCEDURE — 99024 POSTOP FOLLOW-UP VISIT: CPT

## 2024-11-06 ENCOUNTER — NON-APPOINTMENT (OUTPATIENT)
Age: 84
End: 2024-11-06

## 2024-11-06 ENCOUNTER — APPOINTMENT (OUTPATIENT)
Dept: UROLOGY | Facility: CLINIC | Age: 84
End: 2024-11-06
Payer: MEDICARE

## 2024-11-06 VITALS
WEIGHT: 159 LBS | SYSTOLIC BLOOD PRESSURE: 127 MMHG | HEART RATE: 75 BPM | DIASTOLIC BLOOD PRESSURE: 62 MMHG | HEIGHT: 66 IN | BODY MASS INDEX: 25.55 KG/M2 | TEMPERATURE: 97.3 F

## 2024-11-06 VITALS
HEIGHT: 66 IN | HEART RATE: 87 BPM | SYSTOLIC BLOOD PRESSURE: 128 MMHG | TEMPERATURE: 98 F | WEIGHT: 159 LBS | BODY MASS INDEX: 25.55 KG/M2 | DIASTOLIC BLOOD PRESSURE: 61 MMHG

## 2024-11-06 DIAGNOSIS — R39.9 UNSPECIFIED SYMPTOMS AND SIGNS INVOLVING THE GENITOURINARY SYSTEM: ICD-10-CM

## 2024-11-06 PROCEDURE — G2211 COMPLEX E/M VISIT ADD ON: CPT

## 2024-11-06 PROCEDURE — 99214 OFFICE O/P EST MOD 30 MIN: CPT | Mod: 25

## 2024-11-06 PROCEDURE — 51798 US URINE CAPACITY MEASURE: CPT

## 2024-12-09 ENCOUNTER — APPOINTMENT (OUTPATIENT)
Dept: ORTHOPEDIC SURGERY | Facility: CLINIC | Age: 84
End: 2024-12-09
Payer: MEDICARE

## 2024-12-09 DIAGNOSIS — M65.322 TRIGGER FINGER, LEFT INDEX FINGER: ICD-10-CM

## 2024-12-09 DIAGNOSIS — G56.03 CARPAL TUNNEL SYNDROM,BILATERAL UPPER LIMBS: ICD-10-CM

## 2024-12-09 DIAGNOSIS — M65.332 TRIGGER FINGER, LEFT MIDDLE FINGER: ICD-10-CM

## 2024-12-09 PROCEDURE — 99024 POSTOP FOLLOW-UP VISIT: CPT

## 2025-02-10 ENCOUNTER — APPOINTMENT (OUTPATIENT)
Dept: ORTHOPEDIC SURGERY | Facility: CLINIC | Age: 85
End: 2025-02-10
Payer: MEDICARE

## 2025-02-10 VITALS — RESPIRATION RATE: 16 BRPM | BODY MASS INDEX: 25.55 KG/M2 | HEIGHT: 66 IN | WEIGHT: 159 LBS

## 2025-02-10 DIAGNOSIS — G56.03 CARPAL TUNNEL SYNDROM,BILATERAL UPPER LIMBS: ICD-10-CM

## 2025-02-10 DIAGNOSIS — M65.332 TRIGGER FINGER, LEFT MIDDLE FINGER: ICD-10-CM

## 2025-02-10 DIAGNOSIS — M65.311 TRIGGER THUMB, RIGHT THUMB: ICD-10-CM

## 2025-02-10 DIAGNOSIS — M65.322 TRIGGER FINGER, LEFT INDEX FINGER: ICD-10-CM

## 2025-02-10 PROCEDURE — 99214 OFFICE O/P EST MOD 30 MIN: CPT | Mod: 57

## 2025-02-28 NOTE — ASU DISCHARGE PLAN (ADULT/PEDIATRIC) - CARE PROVIDER_API CALL
Eric Tejeda  [facility]  [address]  [place] [address]  Phone: [phone]  Fax: [phone]  Follow Up Time:

## 2025-02-28 NOTE — ASU DISCHARGE PLAN (ADULT/PEDIATRIC) - FINANCIAL ASSISTANCE
[facility] provides services at a reduced cost to those who are determined to be eligible through [facility]’s financial assistance program. Information regarding [facility]’s financial assistance program can be found by going to [email or web address] or by calling [phone].

## 2025-02-28 NOTE — BRIEF OPERATIVE NOTE - NSICDXBRIEFPROCEDURE_GEN_ALL_CORE_FT
PROCEDURES:  Endoscopic carpal tunnel release of right wrist 28-Feb-2025 10:06:17  Latasha Alexander

## 2025-02-28 NOTE — ASU PATIENT PROFILE, ADULT - NSICDXPASTMEDICALHX_GEN_ALL_CORE_FT
PAST MEDICAL HISTORY:  CAD (coronary artery disease)   CABG 2009    Deep vein thrombosis (DVT) 5/2021 right LE    DM (diabetes mellitus) type 2    HLD (hyperlipidemia)     HTN (hypertension)     Large bowel obstruction     Neuropathy     Renal dysfunction     Stomach tumor (benign)      PAST MEDICAL HISTORY:  Blind right eye     CAD (coronary artery disease)   CABG 2009    Deep vein thrombosis (DVT) 5/2021 right LE    DM (diabetes mellitus) type 2    HLD (hyperlipidemia)     HTN (hypertension)     Large bowel obstruction     Neuropathy     Renal dysfunction     Stomach tumor (benign)

## 2025-02-28 NOTE — ASU DISCHARGE PLAN (ADULT/PEDIATRIC) - ASU DC SPECIAL INSTRUCTIONSFT
Co-Directors: Eric Tejeda MD; Joe Doan MD; Doug Price MD   [facility] [place] [facility] of [address] [place] 	  [place] [address] 	 	  Phone [phone] (HAND), Fax [phone]    [email or web address]    Hand Surgery Post Operative Instructions      DRESSING CARE:  1.	Please keep bandage ON and DRY until you return to the office for your 1st postoperative visit.   2.	In the shower you must cover bandage with a plastic bag. You can use tape or a rubber band so no water leaks into the bag.   3.	Please do not exercise as that leads to excessive sweating as the bandage will therefore become moist/ wet.    4.	Do not remove or change your bandage. You may apply more tape if dressing starts to unravel.   5.	Please do not insert any objects, such as a pencil, down into the bandage.     ELEVATION:  1.	Keep hand/wrist above heart level at all times or until bandage feels loose. This will help with swelling of the fingers and can be accomplished by using the FOAM PILLOW.   2.	 A sling will not hold your hand/wrist above your heart and therefore its use should be limited (it may also cause shoulder stiffness).     ACTIVITY:  1.	Moving your fingers daily after surgery is very important to prevent stiffness. Please open your fingers completely and close your fingers completely to achieve full range of motion.  **UNLESS TENDON REPAIR OR NERVE REPAIR SURGERY PERFORMED    2.	Move all joints of the extremity that are not immobilized to prevent stiffness (i.e. shoulder, elbow, fingers, and thumb unless instructed otherwise).   3.	Avoid activities which may re-injure your hand or finger.     DIET:   Regular diet. Start light and progress as tolerated.     PAIN MEDICINE:   1.	Pain medicine was sent to your pharmacy.  2.	Take pain medicine on an “AS NEEDED” basis according to your doctor’s instructions.   3.	Your pain will decrease over the next few days allowing you to:   •	Decrease your pain medicine quantity until you stop.   •	Increase the time between doses until you stop.   4.	You should not drink alcoholic beverages while on pain medication.   5.	Take pain medicine with food to prevent nausea.   6.	Constipation can occur. If no bowel movement occurs within 48 hours take a laxative of your choice (over the counter).	 	      CONTACT PHYSICIAN FOR:   Slight pain, swelling and bluish discoloration are to be expected. If you have breathing difficulty or chest pain dial 911 immediately. However, if the following symptoms occur notify your physician:   •	Temperature above 101° F 	        • Inability to urinate in 8 hours   •	Uncontrolled nausea/vomiting   	• Progressively increasing pain   •	Signs of wound infection 	                • Excessive bleeding  (Redness, swelling, pus-like drainage)     • Increasing numbness   •	Excessive swelling and tightness 	• Splint or cast that is too tight      OFFICE APPOINTMENT:    A staff member from the office will call you in the next 1-2 days to schedule your 1st Post Operative appointment to see your physician back in the office. *IF someone does not reach out to you in the next 1-2 days please call the office.      Patient Name _________________________________ Signature ______________________________ Date__________    Witness _____________________________________________________ Date ______________

## 2025-02-28 NOTE — ASU PATIENT PROFILE, ADULT - NSICDXPASTSURGICALHX_GEN_ALL_CORE_FT
PAST SURGICAL HISTORY:  H/O colonoscopy     H/O coronary artery bypass surgery 2009    H/O hernia repair right ingunial    History of gastrectomy 1968    History of partial surgical removal of colon 2023 at [facility]    Post corneal transplant b/l    S/P eye surgery b/l corneal transplant

## 2025-03-03 RX ORDER — OXYCODONE AND ACETAMINOPHEN 5; 325 MG/1; MG/1
5-325 TABLET ORAL
Qty: 10 | Refills: 0 | Status: ACTIVE | COMMUNITY
Start: 2025-03-03 | End: 1900-01-01

## 2025-03-04 ENCOUNTER — OUTPATIENT (OUTPATIENT)
Dept: OUTPATIENT SERVICES | Facility: HOSPITAL | Age: 85
LOS: 1 days | Discharge: HOME | End: 2025-03-04

## 2025-03-04 ENCOUNTER — APPOINTMENT (OUTPATIENT)
Dept: ORTHOPEDIC SURGERY | Facility: AMBULATORY SURGERY CENTER | Age: 85
End: 2025-03-04

## 2025-03-04 VITALS
OXYGEN SATURATION: 98 % | RESPIRATION RATE: 16 BRPM | SYSTOLIC BLOOD PRESSURE: 180 MMHG | HEIGHT: 66 IN | WEIGHT: 152.78 LBS | TEMPERATURE: 100 F | HEART RATE: 85 BPM | DIASTOLIC BLOOD PRESSURE: 79 MMHG

## 2025-03-04 VITALS
SYSTOLIC BLOOD PRESSURE: 177 MMHG | OXYGEN SATURATION: 100 % | DIASTOLIC BLOOD PRESSURE: 73 MMHG | RESPIRATION RATE: 16 BRPM | HEART RATE: 76 BPM | TEMPERATURE: 97 F

## 2025-03-04 DIAGNOSIS — Z90.3 ACQUIRED ABSENCE OF STOMACH [PART OF]: Chronic | ICD-10-CM

## 2025-03-04 DIAGNOSIS — Z95.1 PRESENCE OF AORTOCORONARY BYPASS GRAFT: Chronic | ICD-10-CM

## 2025-03-04 DIAGNOSIS — Z94.7 CORNEAL TRANSPLANT STATUS: Chronic | ICD-10-CM

## 2025-03-04 DIAGNOSIS — Z98.890 OTHER SPECIFIED POSTPROCEDURAL STATES: Chronic | ICD-10-CM

## 2025-03-04 PROCEDURE — 29848 WRIST ENDOSCOPY/SURGERY: CPT | Mod: RT

## 2025-03-04 RX ORDER — SODIUM CHLORIDE 9 G/1000ML
500 INJECTION, SOLUTION INTRAVENOUS
Refills: 0 | Status: DISCONTINUED | OUTPATIENT
Start: 2025-03-04 | End: 2025-03-04

## 2025-03-04 RX ORDER — OXYCODONE HYDROCHLORIDE 30 MG/1
5 TABLET ORAL ONCE
Refills: 0 | Status: DISCONTINUED | OUTPATIENT
Start: 2025-03-04 | End: 2025-03-04

## 2025-03-04 RX ORDER — ACETAMINOPHEN 500 MG/5ML
650 LIQUID (ML) ORAL ONCE
Refills: 0 | Status: DISCONTINUED | OUTPATIENT
Start: 2025-03-04 | End: 2025-03-04

## 2025-03-04 NOTE — PRE-ANESTHESIA EVALUATION ADULT - BP NONINVASIVE DIASTOLIC (MM HG)
Patient is a 80y old  Female who presents with a chief complaint of abdominal pain (06 Aug 2020 23:18)  was found to have ascites, got drained with 3 liters removed,   ct angio done and it showed right PE w/o any hemodynamic instability, no evidence of right heart strain     OVERNIGHT EVENTS: no acute events overnight     SUBJECTIVE / INTERVAL HPI: Patient seen and examined at bedside. she said that she is feeling better, no more abdominal pain     VITAL SIGNS:  Vital Signs Last 24 Hrs  T(C): 37.3 (07 Aug 2020 05:27), Max: 37.3 (07 Aug 2020 05:27)  T(F): 99.2 (07 Aug 2020 05:27), Max: 99.2 (07 Aug 2020 05:27)  HR: 98 (07 Aug 2020 05:27) (98 - 121)  BP: 122/69 (07 Aug 2020 05:27) (114/63 - 127/72)  BP(mean): --  RR: 18 (07 Aug 2020 05:27) (18 - 20)  SpO2: 98% (07 Aug 2020 05:27) (98% - 99%)    PHYSICAL EXAM:    General: WDWN  HEENT: NC/AT; PERRL, clear conjunctiva  Neck: supple  Cardiovascular: +S1/S2; RRR  Respiratory: CTA b/l; no W/R/R  Gastrointestinal: soft, NT/ND; +BSx4  Extremities: WWP; 2+ peripheral pulses; no edema   Neurological: AAOx3; no focal deficits    MEDICATIONS:  MEDICATIONS  (STANDING):  amLODIPine   Tablet 5 milliGRAM(s) Oral daily  aspirin enteric coated 81 milliGRAM(s) Oral daily  atorvastatin 40 milliGRAM(s) Oral at bedtime  enoxaparin Injectable 70 milliGRAM(s) SubCutaneous every 12 hours  metoprolol succinate ER 25 milliGRAM(s) Oral daily  sodium chloride 0.9%. 1000 milliLiter(s) (60 mL/Hr) IV Continuous <Continuous>    MEDICATIONS  (PRN):  clonazePAM  Tablet 0.5 milliGRAM(s) Oral daily PRN anxiety      ALLERGIES:  Allergies    chloroquine (Hives)    Intolerances        LABS:                        11.1   8.94  )-----------( 492      ( 07 Aug 2020 05:54 )             33.1     08-07    136  |  98  |  12  ----------------------------<  97  4.4   |  24  |  0.8    Ca    8.0<L>      07 Aug 2020 05:54    TPro  5.0<L>  /  Alb  2.7<L>  /  TBili  0.3  /  DBili  x   /  AST  21  /  ALT  9   /  AlkPhos  54  08-07      Urinalysis Basic - ( 06 Aug 2020 19:35 )    Color: Light Yellow / Appearance: Clear / S.030 / pH: x  Gluc: x / Ketone: Negative  / Bili: Negative / Urobili: <2 mg/dL   Blood: x / Protein: Negative / Nitrite: Negative   Leuk Esterase: Negative / RBC: x / WBC x   Sq Epi: x / Non Sq Epi: x / Bacteria: x      CAPILLARY BLOOD GLUCOSE          RADIOLOGY & ADDITIONAL TESTS: Reviewed.    CT ANGIO : Intraluminal filling defect is seen within the segmental branches of the right lower lobe pulmonary artery, indicating acute pulmonary embolism. (-138; 10/51-). Filling defect in segmental branch of the right upper lobe pulmonary artery. () There is a small filling defect within a left lower lobe pulmonary artery segmental branch (147; 10/59). No evidence of right heart strain. The right ventricle measures 3.5 cm; the left ventricle measures 3.7 cm. (RV:LV<1)    2. Compared with the previous CT scan of 2020, there is now a large amount of ascites throughout the abdomen and pelvis. 80

## 2025-03-11 PROBLEM — H54.40 BLINDNESS, ONE EYE, UNSPECIFIED EYE: Chronic | Status: ACTIVE | Noted: 2025-03-04

## 2025-03-11 PROBLEM — K56.609 UNSPECIFIED INTESTINAL OBSTRUCTION, UNSPECIFIED AS TO PARTIAL VERSUS COMPLETE OBSTRUCTION: Chronic | Status: ACTIVE | Noted: 2025-02-28

## 2025-03-13 ENCOUNTER — APPOINTMENT (OUTPATIENT)
Dept: ORTHOPEDIC SURGERY | Facility: CLINIC | Age: 85
End: 2025-03-13

## 2025-03-13 ENCOUNTER — APPOINTMENT (OUTPATIENT)
Dept: ORTHOPEDIC SURGERY | Facility: CLINIC | Age: 85
End: 2025-03-13
Payer: MEDICARE

## 2025-03-13 DIAGNOSIS — G56.03 CARPAL TUNNEL SYNDROM,BILATERAL UPPER LIMBS: ICD-10-CM

## 2025-03-13 PROCEDURE — 99024 POSTOP FOLLOW-UP VISIT: CPT

## 2025-04-30 ENCOUNTER — APPOINTMENT (OUTPATIENT)
Dept: ORTHOPEDIC SURGERY | Facility: CLINIC | Age: 85
End: 2025-04-30
Payer: MEDICARE

## 2025-04-30 DIAGNOSIS — G56.03 CARPAL TUNNEL SYNDROM,BILATERAL UPPER LIMBS: ICD-10-CM

## 2025-04-30 DIAGNOSIS — M65.322 TRIGGER FINGER, LEFT INDEX FINGER: ICD-10-CM

## 2025-04-30 PROCEDURE — 99024 POSTOP FOLLOW-UP VISIT: CPT

## 2025-05-07 ENCOUNTER — APPOINTMENT (OUTPATIENT)
Dept: UROLOGY | Facility: CLINIC | Age: 85
End: 2025-05-07
Payer: MEDICARE

## 2025-05-07 ENCOUNTER — NON-APPOINTMENT (OUTPATIENT)
Age: 85
End: 2025-05-07

## 2025-05-07 VITALS
DIASTOLIC BLOOD PRESSURE: 63 MMHG | HEIGHT: 66 IN | SYSTOLIC BLOOD PRESSURE: 162 MMHG | TEMPERATURE: 97.2 F | HEART RATE: 80 BPM | BODY MASS INDEX: 25.55 KG/M2 | WEIGHT: 159 LBS

## 2025-05-07 DIAGNOSIS — R39.9 UNSPECIFIED SYMPTOMS AND SIGNS INVOLVING THE GENITOURINARY SYSTEM: ICD-10-CM

## 2025-05-07 PROCEDURE — 51798 US URINE CAPACITY MEASURE: CPT

## 2025-05-07 PROCEDURE — 99214 OFFICE O/P EST MOD 30 MIN: CPT | Mod: 25

## 2025-05-08 LAB
APPEARANCE: CLEAR
BACTERIA: NEGATIVE /HPF
BILIRUBIN URINE: NEGATIVE
BLOOD URINE: NEGATIVE
CAST: 0 /LPF
COLOR: YELLOW
EPITHELIAL CELLS: 0 /HPF
GLUCOSE QUALITATIVE U: >=1000 MG/DL
KETONES URINE: NEGATIVE MG/DL
LEUKOCYTE ESTERASE URINE: NEGATIVE
MICROSCOPIC-UA: NORMAL
NITRITE URINE: NEGATIVE
PH URINE: 6
PROTEIN URINE: 100 MG/DL
RED BLOOD CELLS URINE: 0 /HPF
SPECIFIC GRAVITY URINE: 1.02
UROBILINOGEN URINE: 0.2 MG/DL
WHITE BLOOD CELLS URINE: 1 /HPF

## 2025-05-09 LAB — BACTERIA UR CULT: NORMAL

## 2025-07-28 VITALS
HEART RATE: 76 BPM | DIASTOLIC BLOOD PRESSURE: 67 MMHG | OXYGEN SATURATION: 100 % | SYSTOLIC BLOOD PRESSURE: 137 MMHG | RESPIRATION RATE: 18 BRPM | TEMPERATURE: 99 F

## 2025-07-28 LAB
ANION GAP SERPL CALC-SCNC: 14 MMOL/L — SIGNIFICANT CHANGE UP (ref 5–17)
ANION GAP SERPL CALC-SCNC: 15 MMOL/L — SIGNIFICANT CHANGE UP (ref 5–17)
APPEARANCE UR: CLEAR — SIGNIFICANT CHANGE UP
BASOPHILS # BLD AUTO: 0.03 K/UL — SIGNIFICANT CHANGE UP (ref 0–0.2)
BASOPHILS NFR BLD AUTO: 0.4 % — SIGNIFICANT CHANGE UP (ref 0–2)
BILIRUB UR-MCNC: NEGATIVE — SIGNIFICANT CHANGE UP
BLD GP AB SCN SERPL QL: NEGATIVE — SIGNIFICANT CHANGE UP
BUN SERPL-MCNC: 35 MG/DL — HIGH (ref 7–23)
BUN SERPL-MCNC: 38 MG/DL — HIGH (ref 7–23)
CALCIUM SERPL-MCNC: 10.5 MG/DL — SIGNIFICANT CHANGE UP (ref 8.4–10.5)
CALCIUM SERPL-MCNC: 9.4 MG/DL — SIGNIFICANT CHANGE UP (ref 8.4–10.5)
CHLORIDE SERPL-SCNC: 106 MMOL/L — SIGNIFICANT CHANGE UP (ref 96–108)
CHLORIDE SERPL-SCNC: 108 MMOL/L — SIGNIFICANT CHANGE UP (ref 96–108)
CO2 SERPL-SCNC: 18 MMOL/L — LOW (ref 22–31)
CO2 SERPL-SCNC: 19 MMOL/L — LOW (ref 22–31)
COLOR SPEC: YELLOW — SIGNIFICANT CHANGE UP
CREAT ?TM UR-MCNC: 34 MG/DL — SIGNIFICANT CHANGE UP
CREAT SERPL-MCNC: 1.87 MG/DL — HIGH (ref 0.5–1.3)
CREAT SERPL-MCNC: 2.14 MG/DL — HIGH (ref 0.5–1.3)
DIFF PNL FLD: NEGATIVE — SIGNIFICANT CHANGE UP
EGFR: 30 ML/MIN/1.73M2 — LOW
EGFR: 30 ML/MIN/1.73M2 — LOW
EGFR: 35 ML/MIN/1.73M2 — LOW
EGFR: 35 ML/MIN/1.73M2 — LOW
EOSINOPHIL # BLD AUTO: 0.4 K/UL — SIGNIFICANT CHANGE UP (ref 0–0.5)
EOSINOPHIL NFR BLD AUTO: 5.3 % — SIGNIFICANT CHANGE UP (ref 0–6)
FERRITIN SERPL-MCNC: 17 NG/ML — LOW (ref 30–400)
GAS PNL BLDV: SIGNIFICANT CHANGE UP
GLUCOSE SERPL-MCNC: 145 MG/DL — HIGH (ref 70–99)
GLUCOSE SERPL-MCNC: 58 MG/DL — LOW (ref 70–99)
GLUCOSE UR QL: 100 MG/DL
HCT VFR BLD CALC: 24.2 % — LOW (ref 39–50)
HGB BLD-MCNC: 7.1 G/DL — LOW (ref 13–17)
IMM GRANULOCYTES # BLD AUTO: 0.02 K/UL — SIGNIFICANT CHANGE UP (ref 0–0.07)
IMM GRANULOCYTES NFR BLD AUTO: 0.3 % — SIGNIFICANT CHANGE UP (ref 0–0.9)
IRON SATN MFR SERPL: 26 UG/DL — LOW (ref 45–165)
IRON SATN MFR SERPL: 8 % — LOW (ref 16–55)
KETONES UR QL: NEGATIVE MG/DL — SIGNIFICANT CHANGE UP
LEUKOCYTE ESTERASE UR-ACNC: NEGATIVE — SIGNIFICANT CHANGE UP
LYMPHOCYTES # BLD AUTO: 1.1 K/UL — SIGNIFICANT CHANGE UP (ref 1–3.3)
LYMPHOCYTES NFR BLD AUTO: 14.5 % — SIGNIFICANT CHANGE UP (ref 13–44)
MCHC RBC-ENTMCNC: 27.1 PG — SIGNIFICANT CHANGE UP (ref 27–34)
MCHC RBC-ENTMCNC: 29.3 G/DL — LOW (ref 32–36)
MCV RBC AUTO: 92.4 FL — SIGNIFICANT CHANGE UP (ref 80–100)
MONOCYTES # BLD AUTO: 0.99 K/UL — HIGH (ref 0–0.9)
MONOCYTES NFR BLD AUTO: 13.1 % — SIGNIFICANT CHANGE UP (ref 2–14)
NEUTROPHILS # BLD AUTO: 5.04 K/UL — SIGNIFICANT CHANGE UP (ref 1.8–7.4)
NEUTROPHILS NFR BLD AUTO: 66.4 % — SIGNIFICANT CHANGE UP (ref 43–77)
NITRITE UR-MCNC: NEGATIVE — SIGNIFICANT CHANGE UP
NRBC # BLD AUTO: 0 K/UL — SIGNIFICANT CHANGE UP (ref 0–0)
NRBC # FLD: 0 K/UL — SIGNIFICANT CHANGE UP (ref 0–0)
NRBC BLD AUTO-RTO: 0 /100 WBCS — SIGNIFICANT CHANGE UP (ref 0–0)
OSMOLALITY UR: 318 MOSM/KG — SIGNIFICANT CHANGE UP (ref 300–900)
PH UR: 7.5 — SIGNIFICANT CHANGE UP (ref 5–8)
PLATELET # BLD AUTO: 191 K/UL — SIGNIFICANT CHANGE UP (ref 150–400)
PMV BLD: 9.9 FL — SIGNIFICANT CHANGE UP (ref 7–13)
POTASSIUM SERPL-MCNC: 5.4 MMOL/L — HIGH (ref 3.5–5.3)
POTASSIUM SERPL-MCNC: 7.2 MMOL/L — CRITICAL HIGH (ref 3.5–5.3)
POTASSIUM SERPL-SCNC: 5.4 MMOL/L — HIGH (ref 3.5–5.3)
POTASSIUM SERPL-SCNC: 7.2 MMOL/L — CRITICAL HIGH (ref 3.5–5.3)
POTASSIUM UR-SCNC: 37 MMOL/L — SIGNIFICANT CHANGE UP
PROT ?TM UR-MCNC: 12 MG/DL — SIGNIFICANT CHANGE UP (ref 0–12)
PROT UR-MCNC: NEGATIVE MG/DL — SIGNIFICANT CHANGE UP
PROT/CREAT UR-RTO: 0.4 RATIO — HIGH (ref 0–0.2)
RBC # BLD: 2.62 M/UL — LOW (ref 4.2–5.8)
RBC # FLD: 14.9 % — HIGH (ref 10.3–14.5)
RH IG SCN BLD-IMP: POSITIVE — SIGNIFICANT CHANGE UP
SODIUM SERPL-SCNC: 139 MMOL/L — SIGNIFICANT CHANGE UP (ref 135–145)
SODIUM SERPL-SCNC: 141 MMOL/L — SIGNIFICANT CHANGE UP (ref 135–145)
SODIUM UR-SCNC: 82 MMOL/L — SIGNIFICANT CHANGE UP
SP GR SPEC: 1.01 — SIGNIFICANT CHANGE UP (ref 1–1.03)
TIBC SERPL-MCNC: 329 UG/DL — SIGNIFICANT CHANGE UP (ref 220–430)
TRANSFERRIN SERPL-MCNC: 274 MG/DL — SIGNIFICANT CHANGE UP (ref 200–360)
UIBC SERPL-MCNC: 303 UG/DL — SIGNIFICANT CHANGE UP (ref 110–370)
UROBILINOGEN FLD QL: 0.2 MG/DL — SIGNIFICANT CHANGE UP (ref 0.2–1)
UUN UR-MCNC: 234 MG/DL — SIGNIFICANT CHANGE UP
WBC # BLD: 7.58 K/UL — SIGNIFICANT CHANGE UP (ref 3.8–10.5)
WBC # FLD AUTO: 7.58 K/UL — SIGNIFICANT CHANGE UP (ref 3.8–10.5)

## 2025-07-28 PROCEDURE — 71045 X-RAY EXAM CHEST 1 VIEW: CPT | Mod: 26

## 2025-07-28 PROCEDURE — 93010 ELECTROCARDIOGRAM REPORT: CPT

## 2025-07-28 PROCEDURE — 99291 CRITICAL CARE FIRST HOUR: CPT

## 2025-07-28 RX ORDER — DEXTROSE 50 % IN WATER 50 %
50 SYRINGE (ML) INTRAVENOUS ONCE
Refills: 0 | Status: COMPLETED | OUTPATIENT
Start: 2025-07-28 | End: 2025-07-28

## 2025-07-28 RX ORDER — SODIUM ZIRCONIUM CYCLOSILICATE 5 G/5G
10 POWDER, FOR SUSPENSION ORAL ONCE
Refills: 0 | Status: COMPLETED | OUTPATIENT
Start: 2025-07-28 | End: 2025-07-28

## 2025-07-28 RX ORDER — SODIUM CHLORIDE 9 G/1000ML
1000 INJECTION, SOLUTION INTRAVENOUS
Refills: 0 | Status: DISCONTINUED | OUTPATIENT
Start: 2025-07-28 | End: 2025-07-28

## 2025-07-28 RX ORDER — CALCIUM GLUCONATE 20 MG/ML
1 INJECTION, SOLUTION INTRAVENOUS ONCE
Refills: 0 | Status: COMPLETED | OUTPATIENT
Start: 2025-07-28 | End: 2025-07-28

## 2025-07-28 RX ORDER — SODIUM CHLORIDE 9 G/1000ML
1000 INJECTION, SOLUTION INTRAVENOUS ONCE
Refills: 0 | Status: COMPLETED | OUTPATIENT
Start: 2025-07-28 | End: 2025-07-28

## 2025-07-28 RX ADMIN — Medication 50 MILLILITER(S): at 19:07

## 2025-07-28 RX ADMIN — Medication 5 UNIT(S): at 20:59

## 2025-07-28 RX ADMIN — SODIUM ZIRCONIUM CYCLOSILICATE 10 GRAM(S): 5 POWDER, FOR SUSPENSION ORAL at 19:07

## 2025-07-28 RX ADMIN — CALCIUM GLUCONATE 100 GRAM(S): 20 INJECTION, SOLUTION INTRAVENOUS at 19:45

## 2025-07-28 RX ADMIN — CALCIUM GLUCONATE 100 GRAM(S): 20 INJECTION, SOLUTION INTRAVENOUS at 21:02

## 2025-07-28 RX ADMIN — Medication 50 MILLILITER(S): at 21:02

## 2025-07-28 RX ADMIN — SODIUM CHLORIDE 1000 MILLILITER(S): 9 INJECTION, SOLUTION INTRAVENOUS at 19:44

## 2025-07-28 RX ADMIN — Medication 5 UNIT(S): at 19:08

## 2025-07-28 RX ADMIN — SODIUM CHLORIDE 1000 MILLILITER(S): 9 INJECTION, SOLUTION INTRAVENOUS at 23:50

## 2025-07-29 ENCOUNTER — INPATIENT (INPATIENT)
Facility: HOSPITAL | Age: 85
LOS: 2 days | Discharge: HOME | DRG: 682 | End: 2025-08-01
Attending: STUDENT IN AN ORGANIZED HEALTH CARE EDUCATION/TRAINING PROGRAM | Admitting: INTERNAL MEDICINE
Payer: MEDICARE

## 2025-07-29 ENCOUNTER — RESULT REVIEW (OUTPATIENT)
Age: 85
End: 2025-07-29

## 2025-07-29 DIAGNOSIS — Z98.890 OTHER SPECIFIED POSTPROCEDURAL STATES: Chronic | ICD-10-CM

## 2025-07-29 DIAGNOSIS — Z90.3 ACQUIRED ABSENCE OF STOMACH [PART OF]: Chronic | ICD-10-CM

## 2025-07-29 DIAGNOSIS — Z95.1 PRESENCE OF AORTOCORONARY BYPASS GRAFT: Chronic | ICD-10-CM

## 2025-07-29 DIAGNOSIS — Z94.7 CORNEAL TRANSPLANT STATUS: Chronic | ICD-10-CM

## 2025-07-29 LAB
ADD ON TEST-SPECIMEN IN LAB: SIGNIFICANT CHANGE UP
ANION GAP SERPL CALC-SCNC: 13 MMOL/L — SIGNIFICANT CHANGE UP (ref 5–17)
ANION GAP SERPL CALC-SCNC: 14 MMOL/L — SIGNIFICANT CHANGE UP (ref 5–17)
ANION GAP SERPL CALC-SCNC: 15 MMOL/L — SIGNIFICANT CHANGE UP (ref 5–17)
ANION GAP SERPL CALC-SCNC: 16 MMOL/L — SIGNIFICANT CHANGE UP (ref 5–17)
ANION GAP SERPL CALC-SCNC: 17 MMOL/L — SIGNIFICANT CHANGE UP (ref 5–17)
BASOPHILS # BLD AUTO: 0.03 K/UL — SIGNIFICANT CHANGE UP (ref 0–0.2)
BASOPHILS # BLD AUTO: 0.04 K/UL — SIGNIFICANT CHANGE UP (ref 0–0.2)
BASOPHILS NFR BLD AUTO: 0.3 % — SIGNIFICANT CHANGE UP (ref 0–2)
BASOPHILS NFR BLD AUTO: 0.4 % — SIGNIFICANT CHANGE UP (ref 0–2)
BLD GP AB SCN SERPL QL: NEGATIVE — SIGNIFICANT CHANGE UP
BUN SERPL-MCNC: 34 MG/DL — HIGH (ref 7–23)
BUN SERPL-MCNC: 35 MG/DL — HIGH (ref 7–23)
BUN SERPL-MCNC: 35 MG/DL — HIGH (ref 7–23)
BUN SERPL-MCNC: 36 MG/DL — HIGH (ref 7–23)
CALCIUM SERPL-MCNC: 10.1 MG/DL — SIGNIFICANT CHANGE UP (ref 8.4–10.5)
CALCIUM SERPL-MCNC: 9.4 MG/DL — SIGNIFICANT CHANGE UP (ref 8.4–10.5)
CALCIUM SERPL-MCNC: 9.6 MG/DL — SIGNIFICANT CHANGE UP (ref 8.4–10.5)
CALCIUM SERPL-MCNC: 9.7 MG/DL — SIGNIFICANT CHANGE UP (ref 8.4–10.5)
CALCIUM SERPL-MCNC: 9.7 MG/DL — SIGNIFICANT CHANGE UP (ref 8.4–10.5)
CALCIUM SERPL-MCNC: 9.8 MG/DL — SIGNIFICANT CHANGE UP (ref 8.4–10.5)
CALCIUM SERPL-MCNC: 9.9 MG/DL — SIGNIFICANT CHANGE UP (ref 8.4–10.5)
CHLORIDE SERPL-SCNC: 100 MMOL/L — SIGNIFICANT CHANGE UP (ref 96–108)
CHLORIDE SERPL-SCNC: 102 MMOL/L — SIGNIFICANT CHANGE UP (ref 96–108)
CHLORIDE SERPL-SCNC: 104 MMOL/L — SIGNIFICANT CHANGE UP (ref 96–108)
CHLORIDE SERPL-SCNC: 98 MMOL/L — SIGNIFICANT CHANGE UP (ref 96–108)
CHLORIDE SERPL-SCNC: 98 MMOL/L — SIGNIFICANT CHANGE UP (ref 96–108)
CHLORIDE SERPL-SCNC: 99 MMOL/L — SIGNIFICANT CHANGE UP (ref 96–108)
CHLORIDE SERPL-SCNC: 99 MMOL/L — SIGNIFICANT CHANGE UP (ref 96–108)
CO2 SERPL-SCNC: 20 MMOL/L — LOW (ref 22–31)
CO2 SERPL-SCNC: 20 MMOL/L — LOW (ref 22–31)
CO2 SERPL-SCNC: 21 MMOL/L — LOW (ref 22–31)
CO2 SERPL-SCNC: 22 MMOL/L — SIGNIFICANT CHANGE UP (ref 22–31)
CO2 SERPL-SCNC: 22 MMOL/L — SIGNIFICANT CHANGE UP (ref 22–31)
CO2 SERPL-SCNC: 23 MMOL/L — SIGNIFICANT CHANGE UP (ref 22–31)
CO2 SERPL-SCNC: 23 MMOL/L — SIGNIFICANT CHANGE UP (ref 22–31)
CREAT ?TM UR-MCNC: 11 MG/DL — SIGNIFICANT CHANGE UP
CREAT ?TM UR-MCNC: 18 MG/DL — SIGNIFICANT CHANGE UP
CREAT SERPL-MCNC: 1.81 MG/DL — HIGH (ref 0.5–1.3)
CREAT SERPL-MCNC: 1.81 MG/DL — HIGH (ref 0.5–1.3)
CREAT SERPL-MCNC: 1.87 MG/DL — HIGH (ref 0.5–1.3)
CREAT SERPL-MCNC: 1.91 MG/DL — HIGH (ref 0.5–1.3)
CREAT SERPL-MCNC: 2.02 MG/DL — HIGH (ref 0.5–1.3)
CREAT SERPL-MCNC: 2.03 MG/DL — HIGH (ref 0.5–1.3)
CREAT SERPL-MCNC: 2.05 MG/DL — HIGH (ref 0.5–1.3)
EGFR: 31 ML/MIN/1.73M2 — LOW
EGFR: 31 ML/MIN/1.73M2 — LOW
EGFR: 32 ML/MIN/1.73M2 — LOW
EGFR: 34 ML/MIN/1.73M2 — LOW
EGFR: 34 ML/MIN/1.73M2 — LOW
EGFR: 35 ML/MIN/1.73M2 — LOW
EGFR: 35 ML/MIN/1.73M2 — LOW
EGFR: 36 ML/MIN/1.73M2 — LOW
EOSINOPHIL # BLD AUTO: 0.27 K/UL — SIGNIFICANT CHANGE UP (ref 0–0.5)
EOSINOPHIL # BLD AUTO: 0.37 K/UL — SIGNIFICANT CHANGE UP (ref 0–0.5)
EOSINOPHIL NFR BLD AUTO: 3 % — SIGNIFICANT CHANGE UP (ref 0–6)
EOSINOPHIL NFR BLD AUTO: 3.5 % — SIGNIFICANT CHANGE UP (ref 0–6)
FOLATE SERPL-MCNC: >20 NG/ML — SIGNIFICANT CHANGE UP
GAS PNL BLDA: SIGNIFICANT CHANGE UP
GLUCOSE SERPL-MCNC: 122 MG/DL — HIGH (ref 70–99)
GLUCOSE SERPL-MCNC: 129 MG/DL — HIGH (ref 70–99)
GLUCOSE SERPL-MCNC: 141 MG/DL — HIGH (ref 70–99)
GLUCOSE SERPL-MCNC: 150 MG/DL — HIGH (ref 70–99)
GLUCOSE SERPL-MCNC: 157 MG/DL — HIGH (ref 70–99)
GLUCOSE SERPL-MCNC: 167 MG/DL — HIGH (ref 70–99)
GLUCOSE SERPL-MCNC: 186 MG/DL — HIGH (ref 70–99)
HAPTOGLOB SERPL-MCNC: 324 MG/DL — HIGH (ref 34–200)
HCT VFR BLD CALC: 24.5 % — LOW (ref 39–50)
HCT VFR BLD CALC: 27.5 % — LOW (ref 39–50)
HGB BLD-MCNC: 7.4 G/DL — LOW (ref 13–17)
HGB BLD-MCNC: 8 G/DL — LOW (ref 13–17)
IMM GRANULOCYTES # BLD AUTO: 0.02 K/UL — SIGNIFICANT CHANGE UP (ref 0–0.07)
IMM GRANULOCYTES # BLD AUTO: 0.03 K/UL — SIGNIFICANT CHANGE UP (ref 0–0.07)
IMM GRANULOCYTES NFR BLD AUTO: 0.2 % — SIGNIFICANT CHANGE UP (ref 0–0.9)
IMM GRANULOCYTES NFR BLD AUTO: 0.3 % — SIGNIFICANT CHANGE UP (ref 0–0.9)
LDH SERPL L TO P-CCNC: 293 U/L — HIGH (ref 50–242)
LYMPHOCYTES # BLD AUTO: 0.84 K/UL — LOW (ref 1–3.3)
LYMPHOCYTES # BLD AUTO: 0.88 K/UL — LOW (ref 1–3.3)
LYMPHOCYTES NFR BLD AUTO: 7.9 % — LOW (ref 13–44)
LYMPHOCYTES NFR BLD AUTO: 9.7 % — LOW (ref 13–44)
MAGNESIUM SERPL-MCNC: 1.8 MG/DL — SIGNIFICANT CHANGE UP (ref 1.6–2.6)
MCHC RBC-ENTMCNC: 26.7 PG — LOW (ref 27–34)
MCHC RBC-ENTMCNC: 26.8 PG — LOW (ref 27–34)
MCHC RBC-ENTMCNC: 29.1 G/DL — LOW (ref 32–36)
MCHC RBC-ENTMCNC: 30.2 G/DL — LOW (ref 32–36)
MCV RBC AUTO: 88.4 FL — SIGNIFICANT CHANGE UP (ref 80–100)
MCV RBC AUTO: 92 FL — SIGNIFICANT CHANGE UP (ref 80–100)
MONOCYTES # BLD AUTO: 1.03 K/UL — HIGH (ref 0–0.9)
MONOCYTES # BLD AUTO: 1.06 K/UL — HIGH (ref 0–0.9)
MONOCYTES NFR BLD AUTO: 11.7 % — SIGNIFICANT CHANGE UP (ref 2–14)
MONOCYTES NFR BLD AUTO: 9.7 % — SIGNIFICANT CHANGE UP (ref 2–14)
NEUTROPHILS # BLD AUTO: 6.79 K/UL — SIGNIFICANT CHANGE UP (ref 1.8–7.4)
NEUTROPHILS # BLD AUTO: 8.32 K/UL — HIGH (ref 1.8–7.4)
NEUTROPHILS NFR BLD AUTO: 75 % — SIGNIFICANT CHANGE UP (ref 43–77)
NEUTROPHILS NFR BLD AUTO: 78.3 % — HIGH (ref 43–77)
NRBC # BLD AUTO: 0 K/UL — SIGNIFICANT CHANGE UP (ref 0–0)
NRBC # BLD AUTO: 0 K/UL — SIGNIFICANT CHANGE UP (ref 0–0)
NRBC # FLD: 0 K/UL — SIGNIFICANT CHANGE UP (ref 0–0)
NRBC # FLD: 0 K/UL — SIGNIFICANT CHANGE UP (ref 0–0)
NRBC BLD AUTO-RTO: 0 /100 WBCS — SIGNIFICANT CHANGE UP (ref 0–0)
NRBC BLD AUTO-RTO: 0 /100 WBCS — SIGNIFICANT CHANGE UP (ref 0–0)
OSMOLALITY UR: 314 MOSM/KG — SIGNIFICANT CHANGE UP (ref 300–900)
OSMOLALITY UR: 339 MOSM/KG — SIGNIFICANT CHANGE UP (ref 300–900)
PHOSPHATE SERPL-MCNC: 3.9 MG/DL — SIGNIFICANT CHANGE UP (ref 2.5–4.5)
PHOSPHATE SERPL-MCNC: 4 MG/DL — SIGNIFICANT CHANGE UP (ref 2.5–4.5)
PHOSPHATE SERPL-MCNC: 5.2 MG/DL — HIGH (ref 2.5–4.5)
PLATELET # BLD AUTO: 194 K/UL — SIGNIFICANT CHANGE UP (ref 150–400)
PLATELET # BLD AUTO: 208 K/UL — SIGNIFICANT CHANGE UP (ref 150–400)
PMV BLD: 10.4 FL — SIGNIFICANT CHANGE UP (ref 7–13)
PMV BLD: 10.5 FL — SIGNIFICANT CHANGE UP (ref 7–13)
POTASSIUM SERPL-MCNC: 5.1 MMOL/L — SIGNIFICANT CHANGE UP (ref 3.5–5.3)
POTASSIUM SERPL-MCNC: 5.2 MMOL/L — SIGNIFICANT CHANGE UP (ref 3.5–5.3)
POTASSIUM SERPL-MCNC: 5.8 MMOL/L — HIGH (ref 3.5–5.3)
POTASSIUM SERPL-MCNC: 6 MMOL/L — HIGH (ref 3.5–5.3)
POTASSIUM SERPL-MCNC: 6.3 MMOL/L — CRITICAL HIGH (ref 3.5–5.3)
POTASSIUM SERPL-MCNC: 6.6 MMOL/L — CRITICAL HIGH (ref 3.5–5.3)
POTASSIUM SERPL-MCNC: 6.7 MMOL/L — CRITICAL HIGH (ref 3.5–5.3)
POTASSIUM SERPL-SCNC: 5.1 MMOL/L — SIGNIFICANT CHANGE UP (ref 3.5–5.3)
POTASSIUM SERPL-SCNC: 5.2 MMOL/L — SIGNIFICANT CHANGE UP (ref 3.5–5.3)
POTASSIUM SERPL-SCNC: 5.8 MMOL/L — HIGH (ref 3.5–5.3)
POTASSIUM SERPL-SCNC: 6 MMOL/L — HIGH (ref 3.5–5.3)
POTASSIUM SERPL-SCNC: 6.3 MMOL/L — CRITICAL HIGH (ref 3.5–5.3)
POTASSIUM SERPL-SCNC: 6.6 MMOL/L — CRITICAL HIGH (ref 3.5–5.3)
POTASSIUM SERPL-SCNC: 6.7 MMOL/L — CRITICAL HIGH (ref 3.5–5.3)
POTASSIUM UR-SCNC: 27 MMOL/L — SIGNIFICANT CHANGE UP
PROT ?TM UR-MCNC: 5 MG/DL — SIGNIFICANT CHANGE UP (ref 0–12)
PROT ?TM UR-MCNC: 8 MG/DL — SIGNIFICANT CHANGE UP (ref 0–12)
PROT/CREAT UR-RTO: 0.4 RATIO — HIGH (ref 0–0.2)
PROT/CREAT UR-RTO: 0.5 RATIO — HIGH (ref 0–0.2)
RBC # BLD: 2.77 M/UL — LOW (ref 4.2–5.8)
RBC # BLD: 2.99 M/UL — LOW (ref 4.2–5.8)
RBC # FLD: 14.6 % — HIGH (ref 10.3–14.5)
RBC # FLD: 14.8 % — HIGH (ref 10.3–14.5)
RH IG SCN BLD-IMP: POSITIVE — SIGNIFICANT CHANGE UP
SODIUM SERPL-SCNC: 134 MMOL/L — LOW (ref 135–145)
SODIUM SERPL-SCNC: 135 MMOL/L — SIGNIFICANT CHANGE UP (ref 135–145)
SODIUM SERPL-SCNC: 135 MMOL/L — SIGNIFICANT CHANGE UP (ref 135–145)
SODIUM SERPL-SCNC: 136 MMOL/L — SIGNIFICANT CHANGE UP (ref 135–145)
SODIUM SERPL-SCNC: 137 MMOL/L — SIGNIFICANT CHANGE UP (ref 135–145)
SODIUM SERPL-SCNC: 137 MMOL/L — SIGNIFICANT CHANGE UP (ref 135–145)
SODIUM SERPL-SCNC: 138 MMOL/L — SIGNIFICANT CHANGE UP (ref 135–145)
SODIUM UR-SCNC: 128 MMOL/L — SIGNIFICANT CHANGE UP
SODIUM UR-SCNC: 131 MMOL/L — SIGNIFICANT CHANGE UP
TROPONIN T, HIGH SENSITIVITY RESULT: 58 NG/L — CRITICAL HIGH (ref 0–51)
UUN UR-MCNC: 122 MG/DL — SIGNIFICANT CHANGE UP
UUN UR-MCNC: 90 MG/DL — SIGNIFICANT CHANGE UP
VIT B12 SERPL-MCNC: 420 PG/ML — SIGNIFICANT CHANGE UP (ref 232–1245)
WBC # BLD: 10.62 K/UL — HIGH (ref 3.8–10.5)
WBC # BLD: 9.06 K/UL — SIGNIFICANT CHANGE UP (ref 3.8–10.5)
WBC # FLD AUTO: 10.62 K/UL — HIGH (ref 3.8–10.5)
WBC # FLD AUTO: 9.06 K/UL — SIGNIFICANT CHANGE UP (ref 3.8–10.5)

## 2025-07-29 PROCEDURE — 83735 ASSAY OF MAGNESIUM: CPT

## 2025-07-29 PROCEDURE — 82977 ASSAY OF GGT: CPT

## 2025-07-29 PROCEDURE — 71045 X-RAY EXAM CHEST 1 VIEW: CPT | Mod: 26

## 2025-07-29 PROCEDURE — 83010 ASSAY OF HAPTOGLOBIN QUANT: CPT

## 2025-07-29 PROCEDURE — 99223 1ST HOSP IP/OBS HIGH 75: CPT

## 2025-07-29 PROCEDURE — 85025 COMPLETE CBC W/AUTO DIFF WBC: CPT

## 2025-07-29 PROCEDURE — 99221 1ST HOSP IP/OBS SF/LOW 40: CPT

## 2025-07-29 PROCEDURE — 82803 BLOOD GASES ANY COMBINATION: CPT

## 2025-07-29 PROCEDURE — 84300 ASSAY OF URINE SODIUM: CPT

## 2025-07-29 PROCEDURE — 84132 ASSAY OF SERUM POTASSIUM: CPT

## 2025-07-29 PROCEDURE — 99223 1ST HOSP IP/OBS HIGH 75: CPT | Mod: GC

## 2025-07-29 PROCEDURE — 82607 VITAMIN B-12: CPT

## 2025-07-29 PROCEDURE — 36415 COLL VENOUS BLD VENIPUNCTURE: CPT

## 2025-07-29 PROCEDURE — 82330 ASSAY OF CALCIUM: CPT

## 2025-07-29 PROCEDURE — 82962 GLUCOSE BLOOD TEST: CPT

## 2025-07-29 PROCEDURE — 94640 AIRWAY INHALATION TREATMENT: CPT

## 2025-07-29 PROCEDURE — 84540 ASSAY OF URINE/UREA-N: CPT

## 2025-07-29 PROCEDURE — 76775 US EXAM ABDO BACK WALL LIM: CPT | Mod: 26

## 2025-07-29 PROCEDURE — 84295 ASSAY OF SERUM SODIUM: CPT

## 2025-07-29 PROCEDURE — 93356 MYOCRD STRAIN IMG SPCKL TRCK: CPT

## 2025-07-29 PROCEDURE — 82746 ASSAY OF FOLIC ACID SERUM: CPT

## 2025-07-29 PROCEDURE — 84484 ASSAY OF TROPONIN QUANT: CPT

## 2025-07-29 PROCEDURE — 82550 ASSAY OF CK (CPK): CPT

## 2025-07-29 PROCEDURE — 83615 LACTATE (LD) (LDH) ENZYME: CPT

## 2025-07-29 PROCEDURE — 80076 HEPATIC FUNCTION PANEL: CPT

## 2025-07-29 PROCEDURE — 84443 ASSAY THYROID STIM HORMONE: CPT

## 2025-07-29 PROCEDURE — 80048 BASIC METABOLIC PNL TOTAL CA: CPT

## 2025-07-29 PROCEDURE — 93306 TTE W/DOPPLER COMPLETE: CPT | Mod: 26

## 2025-07-29 PROCEDURE — 84133 ASSAY OF URINE POTASSIUM: CPT

## 2025-07-29 PROCEDURE — 83935 ASSAY OF URINE OSMOLALITY: CPT

## 2025-07-29 PROCEDURE — 84100 ASSAY OF PHOSPHORUS: CPT

## 2025-07-29 PROCEDURE — 82553 CREATINE MB FRACTION: CPT

## 2025-07-29 PROCEDURE — 83605 ASSAY OF LACTIC ACID: CPT

## 2025-07-29 PROCEDURE — 82570 ASSAY OF URINE CREATININE: CPT

## 2025-07-29 PROCEDURE — 93010 ELECTROCARDIOGRAM REPORT: CPT

## 2025-07-29 PROCEDURE — 84156 ASSAY OF PROTEIN URINE: CPT

## 2025-07-29 PROCEDURE — 83880 ASSAY OF NATRIURETIC PEPTIDE: CPT

## 2025-07-29 RX ORDER — APIXABAN 5 MG/1
2.5 TABLET, FILM COATED ORAL EVERY 12 HOURS
Refills: 0 | Status: DISCONTINUED | OUTPATIENT
Start: 2025-07-29 | End: 2025-07-30

## 2025-07-29 RX ORDER — FUROSEMIDE 10 MG/ML
40 INJECTION INTRAMUSCULAR; INTRAVENOUS ONCE
Refills: 0 | Status: COMPLETED | OUTPATIENT
Start: 2025-07-29 | End: 2025-07-29

## 2025-07-29 RX ORDER — CALCIUM GLUCONATE 20 MG/ML
1 INJECTION, SOLUTION INTRAVENOUS ONCE
Refills: 0 | Status: COMPLETED | OUTPATIENT
Start: 2025-07-29 | End: 2025-07-29

## 2025-07-29 RX ORDER — GLUCAGON 3 MG/1
1 POWDER NASAL ONCE
Refills: 0 | Status: DISCONTINUED | OUTPATIENT
Start: 2025-07-29 | End: 2025-08-01

## 2025-07-29 RX ORDER — SODIUM ZIRCONIUM CYCLOSILICATE 5 G/5G
10 POWDER, FOR SUSPENSION ORAL EVERY 8 HOURS
Refills: 0 | Status: DISCONTINUED | OUTPATIENT
Start: 2025-07-29 | End: 2025-07-30

## 2025-07-29 RX ORDER — FLUOROMETHOLONE 0.1 %
1 SUSPENSION, DROPS(FINAL DOSAGE FORM)(ML) OPHTHALMIC (EYE) THREE TIMES A DAY
Refills: 0 | Status: DISCONTINUED | OUTPATIENT
Start: 2025-07-29 | End: 2025-08-01

## 2025-07-29 RX ORDER — FLUDROCORTISONE ACETATE 0.1 MG
0.2 TABLET ORAL ONCE
Refills: 0 | Status: COMPLETED | OUTPATIENT
Start: 2025-07-29 | End: 2025-07-29

## 2025-07-29 RX ORDER — POLYETHYLENE GLYCOL 3350 17 G/17G
17 POWDER, FOR SOLUTION ORAL EVERY 24 HOURS
Refills: 0 | Status: DISCONTINUED | OUTPATIENT
Start: 2025-07-29 | End: 2025-08-01

## 2025-07-29 RX ORDER — IRON SUCROSE 20 MG/ML
300 INJECTION, SOLUTION INTRAVENOUS EVERY 24 HOURS
Refills: 0 | Status: COMPLETED | OUTPATIENT
Start: 2025-07-29 | End: 2025-07-31

## 2025-07-29 RX ORDER — BRIMONIDINE TARTRATE, TIMOLOL MALEATE 2; 5 MG/ML; MG/ML
1 SOLUTION/ DROPS TOPICAL
Refills: 0 | Status: DISCONTINUED | OUTPATIENT
Start: 2025-07-29 | End: 2025-08-01

## 2025-07-29 RX ORDER — DEXTROSE 50 % IN WATER 50 %
25 SYRINGE (ML) INTRAVENOUS ONCE
Refills: 0 | Status: DISCONTINUED | OUTPATIENT
Start: 2025-07-29 | End: 2025-07-29

## 2025-07-29 RX ORDER — NETARSUDIL 0.2 MG/ML
1 SOLUTION/ DROPS OPHTHALMIC; TOPICAL
Refills: 0 | DISCHARGE

## 2025-07-29 RX ORDER — DEXTROSE 50 % IN WATER 50 %
50 SYRINGE (ML) INTRAVENOUS ONCE
Refills: 0 | Status: COMPLETED | OUTPATIENT
Start: 2025-07-29 | End: 2025-07-29

## 2025-07-29 RX ORDER — ROSUVASTATIN CALCIUM 20 MG/1
5 TABLET, FILM COATED ORAL AT BEDTIME
Refills: 0 | Status: DISCONTINUED | OUTPATIENT
Start: 2025-07-29 | End: 2025-08-01

## 2025-07-29 RX ORDER — SODIUM CHLORIDE 9 G/1000ML
1000 INJECTION, SOLUTION INTRAVENOUS
Refills: 0 | Status: DISCONTINUED | OUTPATIENT
Start: 2025-07-29 | End: 2025-07-29

## 2025-07-29 RX ORDER — DEXTROSE 50 % IN WATER 50 %
25 SYRINGE (ML) INTRAVENOUS ONCE
Refills: 0 | Status: COMPLETED | OUTPATIENT
Start: 2025-07-29 | End: 2025-07-29

## 2025-07-29 RX ORDER — MAGNESIUM CITRATE
296 SOLUTION, ORAL ORAL ONCE
Refills: 0 | Status: COMPLETED | OUTPATIENT
Start: 2025-07-29 | End: 2025-07-29

## 2025-07-29 RX ORDER — ALBUTEROL SULFATE 2.5 MG/3ML
2.5 VIAL, NEBULIZER (ML) INHALATION EVERY 6 HOURS
Refills: 0 | Status: DISCONTINUED | OUTPATIENT
Start: 2025-07-29 | End: 2025-07-30

## 2025-07-29 RX ORDER — NITROGLYCERIN 20 MG/G
0.4 OINTMENT TOPICAL ONCE
Refills: 0 | Status: COMPLETED | OUTPATIENT
Start: 2025-07-29 | End: 2025-07-29

## 2025-07-29 RX ORDER — ALBUTEROL SULFATE 2.5 MG/3ML
1 VIAL, NEBULIZER (ML) INHALATION EVERY 4 HOURS
Refills: 0 | Status: DISCONTINUED | OUTPATIENT
Start: 2025-07-29 | End: 2025-08-01

## 2025-07-29 RX ORDER — SODIUM CHLORIDE 9 G/1000ML
1000 INJECTION, SOLUTION INTRAVENOUS
Refills: 0 | Status: DISCONTINUED | OUTPATIENT
Start: 2025-07-29 | End: 2025-08-01

## 2025-07-29 RX ORDER — MAGNESIUM SULFATE 500 MG/ML
2 SYRINGE (ML) INJECTION ONCE
Refills: 0 | Status: COMPLETED | OUTPATIENT
Start: 2025-07-29 | End: 2025-07-29

## 2025-07-29 RX ORDER — DEXTROSE 50 % IN WATER 50 %
25 SYRINGE (ML) INTRAVENOUS ONCE
Refills: 0 | Status: DISCONTINUED | OUTPATIENT
Start: 2025-07-29 | End: 2025-08-01

## 2025-07-29 RX ORDER — DEXTROSE 50 % IN WATER 50 %
15 SYRINGE (ML) INTRAVENOUS ONCE
Refills: 0 | Status: DISCONTINUED | OUTPATIENT
Start: 2025-07-29 | End: 2025-08-01

## 2025-07-29 RX ORDER — INSULIN LISPRO 100 U/ML
INJECTION, SOLUTION INTRAVENOUS; SUBCUTANEOUS
Refills: 0 | Status: DISCONTINUED | OUTPATIENT
Start: 2025-07-29 | End: 2025-08-01

## 2025-07-29 RX ORDER — DEXTROSE 50 % IN WATER 50 %
12.5 SYRINGE (ML) INTRAVENOUS ONCE
Refills: 0 | Status: DISCONTINUED | OUTPATIENT
Start: 2025-07-29 | End: 2025-08-01

## 2025-07-29 RX ORDER — FUROSEMIDE 10 MG/ML
80 INJECTION INTRAMUSCULAR; INTRAVENOUS ONCE
Refills: 0 | Status: COMPLETED | OUTPATIENT
Start: 2025-07-29 | End: 2025-07-29

## 2025-07-29 RX ORDER — OMEPRAZOLE 20 MG/1
1 CAPSULE, DELAYED RELEASE ORAL
Refills: 0 | DISCHARGE

## 2025-07-29 RX ORDER — LISINOPRIL 5 MG/1
10 TABLET ORAL ONCE
Refills: 0 | Status: COMPLETED | OUTPATIENT
Start: 2025-07-29 | End: 2025-07-29

## 2025-07-29 RX ORDER — FLUOROMETHOLONE 0.1 %
1 SUSPENSION, DROPS(FINAL DOSAGE FORM)(ML) OPHTHALMIC (EYE)
Refills: 0 | DISCHARGE

## 2025-07-29 RX ORDER — INSULIN LISPRO 100 U/ML
INJECTION, SOLUTION INTRAVENOUS; SUBCUTANEOUS AT BEDTIME
Refills: 0 | Status: DISCONTINUED | OUTPATIENT
Start: 2025-07-29 | End: 2025-08-01

## 2025-07-29 RX ORDER — METOPROLOL SUCCINATE 50 MG/1
1 TABLET, EXTENDED RELEASE ORAL
Refills: 0 | DISCHARGE

## 2025-07-29 RX ORDER — FUROSEMIDE 10 MG/ML
80 INJECTION INTRAMUSCULAR; INTRAVENOUS ONCE
Refills: 0 | Status: DISCONTINUED | OUTPATIENT
Start: 2025-07-29 | End: 2025-07-29

## 2025-07-29 RX ORDER — METOPROLOL SUCCINATE 50 MG/1
50 TABLET, EXTENDED RELEASE ORAL EVERY 24 HOURS
Refills: 0 | Status: DISCONTINUED | OUTPATIENT
Start: 2025-07-29 | End: 2025-08-01

## 2025-07-29 RX ADMIN — NITROGLYCERIN 0.4 MILLIGRAM(S): 20 OINTMENT TOPICAL at 01:25

## 2025-07-29 RX ADMIN — Medication 5 UNIT(S): at 12:58

## 2025-07-29 RX ADMIN — CALCIUM GLUCONATE 100 GRAM(S): 20 INJECTION, SOLUTION INTRAVENOUS at 03:31

## 2025-07-29 RX ADMIN — Medication 25 MILLILITER(S): at 12:58

## 2025-07-29 RX ADMIN — Medication 50 MILLILITER(S): at 15:28

## 2025-07-29 RX ADMIN — Medication 25 MILLILITER(S): at 07:49

## 2025-07-29 RX ADMIN — IRON SUCROSE 176.67 MILLIGRAM(S): 20 INJECTION, SOLUTION INTRAVENOUS at 05:33

## 2025-07-29 RX ADMIN — CALCIUM GLUCONATE 100 GRAM(S): 20 INJECTION, SOLUTION INTRAVENOUS at 07:52

## 2025-07-29 RX ADMIN — SODIUM ZIRCONIUM CYCLOSILICATE 10 GRAM(S): 5 POWDER, FOR SUSPENSION ORAL at 06:27

## 2025-07-29 RX ADMIN — Medication 2.5 MILLIGRAM(S): at 22:12

## 2025-07-29 RX ADMIN — Medication 0.2 MILLIGRAM(S): at 16:51

## 2025-07-29 RX ADMIN — CALCIUM GLUCONATE 100 GRAM(S): 20 INJECTION, SOLUTION INTRAVENOUS at 15:29

## 2025-07-29 RX ADMIN — Medication 296 MILLILITER(S): at 16:51

## 2025-07-29 RX ADMIN — Medication 5 UNIT(S): at 15:29

## 2025-07-29 RX ADMIN — Medication 50 MILLILITER(S): at 03:31

## 2025-07-29 RX ADMIN — NITROGLYCERIN 0.4 MILLIGRAM(S): 20 OINTMENT TOPICAL at 02:26

## 2025-07-29 RX ADMIN — SODIUM ZIRCONIUM CYCLOSILICATE 10 GRAM(S): 5 POWDER, FOR SUSPENSION ORAL at 22:12

## 2025-07-29 RX ADMIN — LISINOPRIL 10 MILLIGRAM(S): 5 TABLET ORAL at 01:53

## 2025-07-29 RX ADMIN — ROSUVASTATIN CALCIUM 5 MILLIGRAM(S): 20 TABLET, FILM COATED ORAL at 22:12

## 2025-07-29 RX ADMIN — METOPROLOL SUCCINATE 50 MILLIGRAM(S): 50 TABLET, EXTENDED RELEASE ORAL at 11:56

## 2025-07-29 RX ADMIN — FUROSEMIDE 40 MILLIGRAM(S): 10 INJECTION INTRAMUSCULAR; INTRAVENOUS at 07:22

## 2025-07-29 RX ADMIN — APIXABAN 2.5 MILLIGRAM(S): 5 TABLET, FILM COATED ORAL at 17:45

## 2025-07-29 RX ADMIN — CALCIUM GLUCONATE 100 GRAM(S): 20 INJECTION, SOLUTION INTRAVENOUS at 12:58

## 2025-07-29 RX ADMIN — POLYETHYLENE GLYCOL 3350 17 GRAM(S): 17 POWDER, FOR SOLUTION ORAL at 12:55

## 2025-07-29 RX ADMIN — Medication 2.5 MILLIGRAM(S): at 15:30

## 2025-07-29 RX ADMIN — Medication 5 UNIT(S): at 03:30

## 2025-07-29 RX ADMIN — SODIUM ZIRCONIUM CYCLOSILICATE 10 GRAM(S): 5 POWDER, FOR SUSPENSION ORAL at 12:55

## 2025-07-29 RX ADMIN — FUROSEMIDE 40 MILLIGRAM(S): 10 INJECTION INTRAMUSCULAR; INTRAVENOUS at 01:35

## 2025-07-29 RX ADMIN — Medication 5 UNIT(S): at 07:50

## 2025-07-29 RX ADMIN — SODIUM CHLORIDE 100 MILLILITER(S): 9 INJECTION, SOLUTION INTRAVENOUS at 21:20

## 2025-07-29 RX ADMIN — FUROSEMIDE 40 MILLIGRAM(S): 10 INJECTION INTRAMUSCULAR; INTRAVENOUS at 02:58

## 2025-07-29 RX ADMIN — SODIUM CHLORIDE 55 MILLILITER(S): 9 INJECTION, SOLUTION INTRAVENOUS at 08:27

## 2025-07-29 RX ADMIN — FUROSEMIDE 80 MILLIGRAM(S): 10 INJECTION INTRAMUSCULAR; INTRAVENOUS at 16:43

## 2025-07-30 DIAGNOSIS — D64.9 ANEMIA, UNSPECIFIED: ICD-10-CM

## 2025-07-30 DIAGNOSIS — E78.5 HYPERLIPIDEMIA, UNSPECIFIED: ICD-10-CM

## 2025-07-30 DIAGNOSIS — G62.9 POLYNEUROPATHY, UNSPECIFIED: ICD-10-CM

## 2025-07-30 DIAGNOSIS — J81.0 ACUTE PULMONARY EDEMA: ICD-10-CM

## 2025-07-30 DIAGNOSIS — I10 ESSENTIAL (PRIMARY) HYPERTENSION: ICD-10-CM

## 2025-07-30 DIAGNOSIS — J96.01 ACUTE RESPIRATORY FAILURE WITH HYPOXIA: ICD-10-CM

## 2025-07-30 DIAGNOSIS — N17.9 ACUTE KIDNEY FAILURE, UNSPECIFIED: ICD-10-CM

## 2025-07-30 DIAGNOSIS — E11.9 TYPE 2 DIABETES MELLITUS WITHOUT COMPLICATIONS: ICD-10-CM

## 2025-07-30 DIAGNOSIS — Z29.9 ENCOUNTER FOR PROPHYLACTIC MEASURES, UNSPECIFIED: ICD-10-CM

## 2025-07-30 DIAGNOSIS — I82.409 ACUTE EMBOLISM AND THROMBOSIS OF UNSPECIFIED DEEP VEINS OF UNSPECIFIED LOWER EXTREMITY: ICD-10-CM

## 2025-07-30 DIAGNOSIS — E87.5 HYPERKALEMIA: ICD-10-CM

## 2025-07-30 DIAGNOSIS — I25.10 ATHEROSCLEROTIC HEART DISEASE OF NATIVE CORONARY ARTERY WITHOUT ANGINA PECTORIS: ICD-10-CM

## 2025-07-30 LAB
A1C WITH ESTIMATED AVERAGE GLUCOSE RESULT: 8.4 % — HIGH (ref 4–5.6)
ALBUMIN SERPL ELPH-MCNC: 3.2 G/DL — LOW (ref 3.3–5)
ALBUMIN SERPL ELPH-MCNC: 3.3 G/DL — SIGNIFICANT CHANGE UP (ref 3.3–5)
ALP SERPL-CCNC: 96 U/L — SIGNIFICANT CHANGE UP (ref 40–120)
ALP SERPL-CCNC: 99 U/L — SIGNIFICANT CHANGE UP (ref 40–120)
ALT FLD-CCNC: 17 U/L — SIGNIFICANT CHANGE UP (ref 10–45)
ALT FLD-CCNC: 20 U/L — SIGNIFICANT CHANGE UP (ref 10–45)
ANION GAP SERPL CALC-SCNC: 15 MMOL/L — SIGNIFICANT CHANGE UP (ref 5–17)
ANION GAP SERPL CALC-SCNC: 15 MMOL/L — SIGNIFICANT CHANGE UP (ref 5–17)
ANION GAP SERPL CALC-SCNC: 16 MMOL/L — SIGNIFICANT CHANGE UP (ref 5–17)
ANION GAP SERPL CALC-SCNC: 16 MMOL/L — SIGNIFICANT CHANGE UP (ref 5–17)
AST SERPL-CCNC: 30 U/L — SIGNIFICANT CHANGE UP (ref 10–40)
AST SERPL-CCNC: 37 U/L — SIGNIFICANT CHANGE UP (ref 10–40)
BILIRUB SERPL-MCNC: 0.4 MG/DL — SIGNIFICANT CHANGE UP (ref 0.2–1.2)
BILIRUB SERPL-MCNC: 0.8 MG/DL — SIGNIFICANT CHANGE UP (ref 0.2–1.2)
BLD GP AB SCN SERPL QL: NEGATIVE — SIGNIFICANT CHANGE UP
BUN SERPL-MCNC: 34 MG/DL — HIGH (ref 7–23)
BUN SERPL-MCNC: 34 MG/DL — HIGH (ref 7–23)
BUN SERPL-MCNC: 35 MG/DL — HIGH (ref 7–23)
BUN SERPL-MCNC: 36 MG/DL — HIGH (ref 7–23)
CALCIUM SERPL-MCNC: 8.3 MG/DL — LOW (ref 8.4–10.5)
CALCIUM SERPL-MCNC: 8.4 MG/DL — SIGNIFICANT CHANGE UP (ref 8.4–10.5)
CALCIUM SERPL-MCNC: 8.4 MG/DL — SIGNIFICANT CHANGE UP (ref 8.4–10.5)
CALCIUM SERPL-MCNC: 8.7 MG/DL — SIGNIFICANT CHANGE UP (ref 8.4–10.5)
CHLORIDE SERPL-SCNC: 100 MMOL/L — SIGNIFICANT CHANGE UP (ref 96–108)
CHLORIDE SERPL-SCNC: 100 MMOL/L — SIGNIFICANT CHANGE UP (ref 96–108)
CHLORIDE SERPL-SCNC: 99 MMOL/L — SIGNIFICANT CHANGE UP (ref 96–108)
CHLORIDE SERPL-SCNC: 99 MMOL/L — SIGNIFICANT CHANGE UP (ref 96–108)
CHOLEST SERPL-MCNC: 97 MG/DL — SIGNIFICANT CHANGE UP
CO2 SERPL-SCNC: 20 MMOL/L — LOW (ref 22–31)
CO2 SERPL-SCNC: 22 MMOL/L — SIGNIFICANT CHANGE UP (ref 22–31)
CO2 SERPL-SCNC: 22 MMOL/L — SIGNIFICANT CHANGE UP (ref 22–31)
CO2 SERPL-SCNC: 23 MMOL/L — SIGNIFICANT CHANGE UP (ref 22–31)
CREAT SERPL-MCNC: 1.82 MG/DL — HIGH (ref 0.5–1.3)
CREAT SERPL-MCNC: 1.93 MG/DL — HIGH (ref 0.5–1.3)
CREAT SERPL-MCNC: 1.95 MG/DL — HIGH (ref 0.5–1.3)
CREAT SERPL-MCNC: 2.07 MG/DL — HIGH (ref 0.5–1.3)
EGFR: 31 ML/MIN/1.73M2 — LOW
EGFR: 31 ML/MIN/1.73M2 — LOW
EGFR: 33 ML/MIN/1.73M2 — LOW
EGFR: 33 ML/MIN/1.73M2 — LOW
EGFR: 34 ML/MIN/1.73M2 — LOW
EGFR: 34 ML/MIN/1.73M2 — LOW
EGFR: 36 ML/MIN/1.73M2 — LOW
EGFR: 36 ML/MIN/1.73M2 — LOW
ESTIMATED AVERAGE GLUCOSE: 194 MG/DL — HIGH (ref 68–114)
GLUCOSE SERPL-MCNC: 124 MG/DL — HIGH (ref 70–99)
GLUCOSE SERPL-MCNC: 144 MG/DL — HIGH (ref 70–99)
GLUCOSE SERPL-MCNC: 167 MG/DL — HIGH (ref 70–99)
GLUCOSE SERPL-MCNC: 252 MG/DL — HIGH (ref 70–99)
HCT VFR BLD CALC: 22.4 % — LOW (ref 39–50)
HCT VFR BLD CALC: 25.2 % — LOW (ref 39–50)
HCT VFR BLD CALC: 27.8 % — LOW (ref 39–50)
HDLC SERPL-MCNC: 39 MG/DL — LOW
HGB BLD-MCNC: 6.8 G/DL — CRITICAL LOW (ref 13–17)
HGB BLD-MCNC: 7.8 G/DL — LOW (ref 13–17)
HGB BLD-MCNC: 8.4 G/DL — LOW (ref 13–17)
LDLC SERPL-MCNC: 40 MG/DL — SIGNIFICANT CHANGE UP
LIPID PNL WITH DIRECT LDL SERPL: 40 MG/DL — SIGNIFICANT CHANGE UP
MAGNESIUM SERPL-MCNC: 2.1 MG/DL — SIGNIFICANT CHANGE UP (ref 1.6–2.6)
MCHC RBC-ENTMCNC: 26.8 PG — LOW (ref 27–34)
MCHC RBC-ENTMCNC: 27.5 PG — SIGNIFICANT CHANGE UP (ref 27–34)
MCHC RBC-ENTMCNC: 28.1 PG — SIGNIFICANT CHANGE UP (ref 27–34)
MCHC RBC-ENTMCNC: 30.2 G/DL — LOW (ref 32–36)
MCHC RBC-ENTMCNC: 30.4 G/DL — LOW (ref 32–36)
MCHC RBC-ENTMCNC: 31 G/DL — LOW (ref 32–36)
MCV RBC AUTO: 88.2 FL — SIGNIFICANT CHANGE UP (ref 80–100)
MCV RBC AUTO: 90.6 FL — SIGNIFICANT CHANGE UP (ref 80–100)
MCV RBC AUTO: 90.8 FL — SIGNIFICANT CHANGE UP (ref 80–100)
NONHDLC SERPL-MCNC: 58 MG/DL — SIGNIFICANT CHANGE UP
NRBC # BLD AUTO: 0 K/UL — SIGNIFICANT CHANGE UP (ref 0–0)
NRBC # FLD: 0 K/UL — SIGNIFICANT CHANGE UP (ref 0–0)
NRBC BLD AUTO-RTO: 0 /100 WBCS — SIGNIFICANT CHANGE UP (ref 0–0)
PHOSPHATE SERPL-MCNC: 4.1 MG/DL — SIGNIFICANT CHANGE UP (ref 2.5–4.5)
PLATELET # BLD AUTO: 147 K/UL — LOW (ref 150–400)
PLATELET # BLD AUTO: 155 K/UL — SIGNIFICANT CHANGE UP (ref 150–400)
PLATELET # BLD AUTO: 167 K/UL — SIGNIFICANT CHANGE UP (ref 150–400)
PMV BLD: 10 FL — SIGNIFICANT CHANGE UP (ref 7–13)
PMV BLD: 9.7 FL — SIGNIFICANT CHANGE UP (ref 7–13)
PMV BLD: 9.8 FL — SIGNIFICANT CHANGE UP (ref 7–13)
POTASSIUM SERPL-MCNC: 3.7 MMOL/L — SIGNIFICANT CHANGE UP (ref 3.5–5.3)
POTASSIUM SERPL-MCNC: 3.9 MMOL/L — SIGNIFICANT CHANGE UP (ref 3.5–5.3)
POTASSIUM SERPL-MCNC: 3.9 MMOL/L — SIGNIFICANT CHANGE UP (ref 3.5–5.3)
POTASSIUM SERPL-MCNC: 4.2 MMOL/L — SIGNIFICANT CHANGE UP (ref 3.5–5.3)
POTASSIUM SERPL-SCNC: 3.7 MMOL/L — SIGNIFICANT CHANGE UP (ref 3.5–5.3)
POTASSIUM SERPL-SCNC: 3.9 MMOL/L — SIGNIFICANT CHANGE UP (ref 3.5–5.3)
POTASSIUM SERPL-SCNC: 3.9 MMOL/L — SIGNIFICANT CHANGE UP (ref 3.5–5.3)
POTASSIUM SERPL-SCNC: 4.2 MMOL/L — SIGNIFICANT CHANGE UP (ref 3.5–5.3)
PROT SERPL-MCNC: 6.2 G/DL — SIGNIFICANT CHANGE UP (ref 6–8.3)
PROT SERPL-MCNC: 6.3 G/DL — SIGNIFICANT CHANGE UP (ref 6–8.3)
RBC # BLD: 2.54 M/UL — LOW (ref 4.2–5.8)
RBC # BLD: 2.78 M/UL — LOW (ref 4.2–5.8)
RBC # BLD: 3.06 M/UL — LOW (ref 4.2–5.8)
RBC # FLD: 14.7 % — HIGH (ref 10.3–14.5)
RH IG SCN BLD-IMP: POSITIVE — SIGNIFICANT CHANGE UP
SODIUM SERPL-SCNC: 135 MMOL/L — SIGNIFICANT CHANGE UP (ref 135–145)
SODIUM SERPL-SCNC: 137 MMOL/L — SIGNIFICANT CHANGE UP (ref 135–145)
SODIUM SERPL-SCNC: 137 MMOL/L — SIGNIFICANT CHANGE UP (ref 135–145)
SODIUM SERPL-SCNC: 138 MMOL/L — SIGNIFICANT CHANGE UP (ref 135–145)
TRIGL SERPL-MCNC: 95 MG/DL — SIGNIFICANT CHANGE UP
WBC # BLD: 5.57 K/UL — SIGNIFICANT CHANGE UP (ref 3.8–10.5)
WBC # BLD: 5.92 K/UL — SIGNIFICANT CHANGE UP (ref 3.8–10.5)
WBC # BLD: 6.19 K/UL — SIGNIFICANT CHANGE UP (ref 3.8–10.5)
WBC # FLD AUTO: 5.57 K/UL — SIGNIFICANT CHANGE UP (ref 3.8–10.5)
WBC # FLD AUTO: 5.92 K/UL — SIGNIFICANT CHANGE UP (ref 3.8–10.5)
WBC # FLD AUTO: 6.19 K/UL — SIGNIFICANT CHANGE UP (ref 3.8–10.5)

## 2025-07-30 PROCEDURE — 84443 ASSAY THYROID STIM HORMONE: CPT

## 2025-07-30 PROCEDURE — 80061 LIPID PANEL: CPT

## 2025-07-30 PROCEDURE — 83880 ASSAY OF NATRIURETIC PEPTIDE: CPT

## 2025-07-30 PROCEDURE — 83550 IRON BINDING TEST: CPT

## 2025-07-30 PROCEDURE — 84300 ASSAY OF URINE SODIUM: CPT

## 2025-07-30 PROCEDURE — 86923 COMPATIBILITY TEST ELECTRIC: CPT

## 2025-07-30 PROCEDURE — 84132 ASSAY OF SERUM POTASSIUM: CPT

## 2025-07-30 PROCEDURE — 83615 LACTATE (LD) (LDH) ENZYME: CPT

## 2025-07-30 PROCEDURE — 82962 GLUCOSE BLOOD TEST: CPT

## 2025-07-30 PROCEDURE — 83735 ASSAY OF MAGNESIUM: CPT

## 2025-07-30 PROCEDURE — 83540 ASSAY OF IRON: CPT

## 2025-07-30 PROCEDURE — 80053 COMPREHEN METABOLIC PANEL: CPT

## 2025-07-30 PROCEDURE — 81003 URINALYSIS AUTO W/O SCOPE: CPT

## 2025-07-30 PROCEDURE — 99233 SBSQ HOSP IP/OBS HIGH 50: CPT

## 2025-07-30 PROCEDURE — 82803 BLOOD GASES ANY COMBINATION: CPT

## 2025-07-30 PROCEDURE — 84100 ASSAY OF PHOSPHORUS: CPT

## 2025-07-30 PROCEDURE — 83935 ASSAY OF URINE OSMOLALITY: CPT

## 2025-07-30 PROCEDURE — 85027 COMPLETE CBC AUTOMATED: CPT

## 2025-07-30 PROCEDURE — 82607 VITAMIN B-12: CPT

## 2025-07-30 PROCEDURE — 84484 ASSAY OF TROPONIN QUANT: CPT

## 2025-07-30 PROCEDURE — 80048 BASIC METABOLIC PNL TOTAL CA: CPT

## 2025-07-30 PROCEDURE — 82728 ASSAY OF FERRITIN: CPT

## 2025-07-30 PROCEDURE — 83605 ASSAY OF LACTIC ACID: CPT

## 2025-07-30 PROCEDURE — 84133 ASSAY OF URINE POTASSIUM: CPT

## 2025-07-30 PROCEDURE — 84466 ASSAY OF TRANSFERRIN: CPT

## 2025-07-30 PROCEDURE — 86850 RBC ANTIBODY SCREEN: CPT

## 2025-07-30 PROCEDURE — 84156 ASSAY OF PROTEIN URINE: CPT

## 2025-07-30 PROCEDURE — 83010 ASSAY OF HAPTOGLOBIN QUANT: CPT

## 2025-07-30 PROCEDURE — 84540 ASSAY OF URINE/UREA-N: CPT

## 2025-07-30 PROCEDURE — 93306 TTE W/DOPPLER COMPLETE: CPT

## 2025-07-30 PROCEDURE — 94640 AIRWAY INHALATION TREATMENT: CPT

## 2025-07-30 PROCEDURE — 82550 ASSAY OF CK (CPK): CPT

## 2025-07-30 PROCEDURE — 82570 ASSAY OF URINE CREATININE: CPT

## 2025-07-30 PROCEDURE — 82746 ASSAY OF FOLIC ACID SERUM: CPT

## 2025-07-30 PROCEDURE — 82330 ASSAY OF CALCIUM: CPT

## 2025-07-30 PROCEDURE — 82553 CREATINE MB FRACTION: CPT

## 2025-07-30 PROCEDURE — 85025 COMPLETE CBC W/AUTO DIFF WBC: CPT

## 2025-07-30 PROCEDURE — 99223 1ST HOSP IP/OBS HIGH 75: CPT | Mod: GC

## 2025-07-30 PROCEDURE — 86900 BLOOD TYPING SEROLOGIC ABO: CPT

## 2025-07-30 PROCEDURE — 86901 BLOOD TYPING SEROLOGIC RH(D): CPT

## 2025-07-30 PROCEDURE — 80076 HEPATIC FUNCTION PANEL: CPT

## 2025-07-30 PROCEDURE — 82977 ASSAY OF GGT: CPT

## 2025-07-30 PROCEDURE — 84295 ASSAY OF SERUM SODIUM: CPT

## 2025-07-30 PROCEDURE — 83036 HEMOGLOBIN GLYCOSYLATED A1C: CPT

## 2025-07-30 PROCEDURE — 36415 COLL VENOUS BLD VENIPUNCTURE: CPT

## 2025-07-30 RX ORDER — LACTULOSE 10 G/15ML
10 SOLUTION ORAL THREE TIMES A DAY
Refills: 0 | Status: DISCONTINUED | OUTPATIENT
Start: 2025-07-30 | End: 2025-08-01

## 2025-07-30 RX ORDER — ACETAMINOPHEN 500 MG/5ML
1000 LIQUID (ML) ORAL ONCE
Refills: 0 | Status: COMPLETED | OUTPATIENT
Start: 2025-07-30 | End: 2025-07-30

## 2025-07-30 RX ORDER — LANOLIN/MINERAL OIL/PETROLATUM
1 OINTMENT (GRAM) OPHTHALMIC (EYE) EVERY 6 HOURS
Refills: 0 | Status: DISCONTINUED | OUTPATIENT
Start: 2025-07-30 | End: 2025-08-01

## 2025-07-30 RX ADMIN — POLYETHYLENE GLYCOL 3350 17 GRAM(S): 17 POWDER, FOR SOLUTION ORAL at 11:47

## 2025-07-30 RX ADMIN — METOPROLOL SUCCINATE 50 MILLIGRAM(S): 50 TABLET, EXTENDED RELEASE ORAL at 11:01

## 2025-07-30 RX ADMIN — Medication 1 DROP(S): at 16:48

## 2025-07-30 RX ADMIN — Medication 25 GRAM(S): at 00:19

## 2025-07-30 RX ADMIN — INSULIN LISPRO 2: 100 INJECTION, SOLUTION INTRAVENOUS; SUBCUTANEOUS at 16:47

## 2025-07-30 RX ADMIN — SODIUM ZIRCONIUM CYCLOSILICATE 10 GRAM(S): 5 POWDER, FOR SUSPENSION ORAL at 05:35

## 2025-07-30 RX ADMIN — Medication 1 DROP(S): at 22:08

## 2025-07-30 RX ADMIN — LACTULOSE 10 GRAM(S): 10 SOLUTION ORAL at 22:06

## 2025-07-30 RX ADMIN — Medication 1 DROP(S): at 10:23

## 2025-07-30 RX ADMIN — APIXABAN 2.5 MILLIGRAM(S): 5 TABLET, FILM COATED ORAL at 05:35

## 2025-07-30 RX ADMIN — INSULIN LISPRO 2: 100 INJECTION, SOLUTION INTRAVENOUS; SUBCUTANEOUS at 07:49

## 2025-07-30 RX ADMIN — Medication 2.5 MILLIGRAM(S): at 10:23

## 2025-07-30 RX ADMIN — IRON SUCROSE 176.67 MILLIGRAM(S): 20 INJECTION, SOLUTION INTRAVENOUS at 05:37

## 2025-07-30 RX ADMIN — INSULIN LISPRO 8: 100 INJECTION, SOLUTION INTRAVENOUS; SUBCUTANEOUS at 10:59

## 2025-07-30 RX ADMIN — Medication 1 DROP(S): at 22:07

## 2025-07-30 RX ADMIN — ROSUVASTATIN CALCIUM 5 MILLIGRAM(S): 20 TABLET, FILM COATED ORAL at 22:06

## 2025-07-30 RX ADMIN — LACTULOSE 10 GRAM(S): 10 SOLUTION ORAL at 07:07

## 2025-07-30 RX ADMIN — Medication 1 DROP(S): at 05:38

## 2025-07-30 RX ADMIN — LACTULOSE 10 GRAM(S): 10 SOLUTION ORAL at 13:03

## 2025-07-30 RX ADMIN — Medication 1 DROP(S): at 13:03

## 2025-07-30 RX ADMIN — BRIMONIDINE TARTRATE, TIMOLOL MALEATE 1 DROP(S): 2; 5 SOLUTION/ DROPS TOPICAL at 08:49

## 2025-07-30 RX ADMIN — Medication 2.5 MILLIGRAM(S): at 04:27

## 2025-07-31 ENCOUNTER — TRANSCRIPTION ENCOUNTER (OUTPATIENT)
Age: 85
End: 2025-07-31

## 2025-07-31 DIAGNOSIS — I50.33 ACUTE ON CHRONIC DIASTOLIC (CONGESTIVE) HEART FAILURE: ICD-10-CM

## 2025-07-31 LAB
ALBUMIN SERPL ELPH-MCNC: 3.4 G/DL — SIGNIFICANT CHANGE UP (ref 3.3–5)
ALP SERPL-CCNC: 99 U/L — SIGNIFICANT CHANGE UP (ref 40–120)
ALT FLD-CCNC: 21 U/L — SIGNIFICANT CHANGE UP (ref 10–45)
ANION GAP SERPL CALC-SCNC: 15 MMOL/L — SIGNIFICANT CHANGE UP (ref 5–17)
ANION GAP SERPL CALC-SCNC: 16 MMOL/L — SIGNIFICANT CHANGE UP (ref 5–17)
APPEARANCE UR: CLEAR — SIGNIFICANT CHANGE UP
AST SERPL-CCNC: 42 U/L — HIGH (ref 10–40)
BASOPHILS # BLD AUTO: 0.02 K/UL — SIGNIFICANT CHANGE UP (ref 0–0.2)
BASOPHILS NFR BLD AUTO: 0.4 % — SIGNIFICANT CHANGE UP (ref 0–2)
BILIRUB SERPL-MCNC: 0.6 MG/DL — SIGNIFICANT CHANGE UP (ref 0.2–1.2)
BILIRUB UR-MCNC: NEGATIVE — SIGNIFICANT CHANGE UP
BUN SERPL-MCNC: 30 MG/DL — HIGH (ref 7–23)
BUN SERPL-MCNC: 31 MG/DL — HIGH (ref 7–23)
CALCIUM SERPL-MCNC: 8.6 MG/DL — SIGNIFICANT CHANGE UP (ref 8.4–10.5)
CALCIUM SERPL-MCNC: 8.6 MG/DL — SIGNIFICANT CHANGE UP (ref 8.4–10.5)
CHLORIDE SERPL-SCNC: 102 MMOL/L — SIGNIFICANT CHANGE UP (ref 96–108)
CHLORIDE SERPL-SCNC: 104 MMOL/L — SIGNIFICANT CHANGE UP (ref 96–108)
CO2 SERPL-SCNC: 21 MMOL/L — LOW (ref 22–31)
CO2 SERPL-SCNC: 22 MMOL/L — SIGNIFICANT CHANGE UP (ref 22–31)
COLOR SPEC: YELLOW — SIGNIFICANT CHANGE UP
CREAT ?TM UR-MCNC: 68 MG/DL — SIGNIFICANT CHANGE UP
CREAT SERPL-MCNC: 1.49 MG/DL — HIGH (ref 0.5–1.3)
CREAT SERPL-MCNC: 1.67 MG/DL — HIGH (ref 0.5–1.3)
DIFF PNL FLD: ABNORMAL
EGFR: 40 ML/MIN/1.73M2 — LOW
EGFR: 40 ML/MIN/1.73M2 — LOW
EGFR: 46 ML/MIN/1.73M2 — LOW
EGFR: 46 ML/MIN/1.73M2 — LOW
EOSINOPHIL # BLD AUTO: 0.38 K/UL — SIGNIFICANT CHANGE UP (ref 0–0.5)
EOSINOPHIL NFR BLD AUTO: 6.7 % — HIGH (ref 0–6)
GLUCOSE SERPL-MCNC: 137 MG/DL — HIGH (ref 70–99)
GLUCOSE SERPL-MCNC: 216 MG/DL — HIGH (ref 70–99)
GLUCOSE UR QL: 250 MG/DL
HCT VFR BLD CALC: 26.3 % — LOW (ref 39–50)
HGB BLD-MCNC: 8.1 G/DL — LOW (ref 13–17)
IMM GRANULOCYTES # BLD AUTO: 0.02 K/UL — SIGNIFICANT CHANGE UP (ref 0–0.07)
IMM GRANULOCYTES NFR BLD AUTO: 0.4 % — SIGNIFICANT CHANGE UP (ref 0–0.9)
KETONES UR QL: 15 MG/DL
LEUKOCYTE ESTERASE UR-ACNC: NEGATIVE — SIGNIFICANT CHANGE UP
LYMPHOCYTES # BLD AUTO: 0.69 K/UL — LOW (ref 1–3.3)
LYMPHOCYTES NFR BLD AUTO: 12.1 % — LOW (ref 13–44)
MAGNESIUM SERPL-MCNC: 2.1 MG/DL — SIGNIFICANT CHANGE UP (ref 1.6–2.6)
MAGNESIUM SERPL-MCNC: 2.2 MG/DL — SIGNIFICANT CHANGE UP (ref 1.6–2.6)
MCHC RBC-ENTMCNC: 27.6 PG — SIGNIFICANT CHANGE UP (ref 27–34)
MCHC RBC-ENTMCNC: 30.8 G/DL — LOW (ref 32–36)
MCV RBC AUTO: 89.5 FL — SIGNIFICANT CHANGE UP (ref 80–100)
MONOCYTES # BLD AUTO: 0.98 K/UL — HIGH (ref 0–0.9)
MONOCYTES NFR BLD AUTO: 17.2 % — HIGH (ref 2–14)
NEUTROPHILS # BLD AUTO: 3.61 K/UL — SIGNIFICANT CHANGE UP (ref 1.8–7.4)
NEUTROPHILS NFR BLD AUTO: 63.2 % — SIGNIFICANT CHANGE UP (ref 43–77)
NITRITE UR-MCNC: NEGATIVE — SIGNIFICANT CHANGE UP
NRBC # BLD AUTO: 0 K/UL — SIGNIFICANT CHANGE UP (ref 0–0)
NRBC # FLD: 0 K/UL — SIGNIFICANT CHANGE UP (ref 0–0)
NRBC BLD AUTO-RTO: 0 /100 WBCS — SIGNIFICANT CHANGE UP (ref 0–0)
OSMOLALITY UR: 519 MOSM/KG — SIGNIFICANT CHANGE UP (ref 300–900)
PH UR: 7.5 — SIGNIFICANT CHANGE UP (ref 5–8)
PHOSPHATE SERPL-MCNC: 2.9 MG/DL — SIGNIFICANT CHANGE UP (ref 2.5–4.5)
PHOSPHATE SERPL-MCNC: 3.7 MG/DL — SIGNIFICANT CHANGE UP (ref 2.5–4.5)
PLATELET # BLD AUTO: 157 K/UL — SIGNIFICANT CHANGE UP (ref 150–400)
PMV BLD: 10.3 FL — SIGNIFICANT CHANGE UP (ref 7–13)
POTASSIUM SERPL-MCNC: 3.3 MMOL/L — LOW (ref 3.5–5.3)
POTASSIUM SERPL-MCNC: 3.7 MMOL/L — SIGNIFICANT CHANGE UP (ref 3.5–5.3)
POTASSIUM SERPL-SCNC: 3.3 MMOL/L — LOW (ref 3.5–5.3)
POTASSIUM SERPL-SCNC: 3.7 MMOL/L — SIGNIFICANT CHANGE UP (ref 3.5–5.3)
PROT SERPL-MCNC: 6.4 G/DL — SIGNIFICANT CHANGE UP (ref 6–8.3)
PROT UR-MCNC: 100 MG/DL
RAPID RVP RESULT: SIGNIFICANT CHANGE UP
RBC # BLD: 2.94 M/UL — LOW (ref 4.2–5.8)
RBC # FLD: 14.6 % — HIGH (ref 10.3–14.5)
SARS-COV-2 RNA SPEC QL NAA+PROBE: SIGNIFICANT CHANGE UP
SODIUM SERPL-SCNC: 139 MMOL/L — SIGNIFICANT CHANGE UP (ref 135–145)
SODIUM SERPL-SCNC: 141 MMOL/L — SIGNIFICANT CHANGE UP (ref 135–145)
SODIUM UR-SCNC: 120 MMOL/L — SIGNIFICANT CHANGE UP
SP GR SPEC: 1.02 — SIGNIFICANT CHANGE UP (ref 1–1.03)
UROBILINOGEN FLD QL: 1 MG/DL — SIGNIFICANT CHANGE UP (ref 0.2–1)
UUN UR-MCNC: 586 MG/DL — SIGNIFICANT CHANGE UP
WBC # BLD: 5.7 K/UL — SIGNIFICANT CHANGE UP (ref 3.8–10.5)
WBC # FLD AUTO: 5.7 K/UL — SIGNIFICANT CHANGE UP (ref 3.8–10.5)

## 2025-07-31 PROCEDURE — 82746 ASSAY OF FOLIC ACID SERUM: CPT

## 2025-07-31 PROCEDURE — 99233 SBSQ HOSP IP/OBS HIGH 50: CPT | Mod: GC

## 2025-07-31 PROCEDURE — 83935 ASSAY OF URINE OSMOLALITY: CPT

## 2025-07-31 PROCEDURE — 81003 URINALYSIS AUTO W/O SCOPE: CPT

## 2025-07-31 PROCEDURE — 84156 ASSAY OF PROTEIN URINE: CPT

## 2025-07-31 PROCEDURE — 84540 ASSAY OF URINE/UREA-N: CPT

## 2025-07-31 PROCEDURE — 80053 COMPREHEN METABOLIC PANEL: CPT

## 2025-07-31 PROCEDURE — 94640 AIRWAY INHALATION TREATMENT: CPT

## 2025-07-31 PROCEDURE — 84466 ASSAY OF TRANSFERRIN: CPT

## 2025-07-31 PROCEDURE — 84443 ASSAY THYROID STIM HORMONE: CPT

## 2025-07-31 PROCEDURE — 84295 ASSAY OF SERUM SODIUM: CPT

## 2025-07-31 PROCEDURE — 84300 ASSAY OF URINE SODIUM: CPT

## 2025-07-31 PROCEDURE — 83540 ASSAY OF IRON: CPT

## 2025-07-31 PROCEDURE — 82803 BLOOD GASES ANY COMBINATION: CPT

## 2025-07-31 PROCEDURE — 82550 ASSAY OF CK (CPK): CPT

## 2025-07-31 PROCEDURE — 83880 ASSAY OF NATRIURETIC PEPTIDE: CPT

## 2025-07-31 PROCEDURE — 83735 ASSAY OF MAGNESIUM: CPT

## 2025-07-31 PROCEDURE — 76775 US EXAM ABDO BACK WALL LIM: CPT

## 2025-07-31 PROCEDURE — 99232 SBSQ HOSP IP/OBS MODERATE 35: CPT

## 2025-07-31 PROCEDURE — 86923 COMPATIBILITY TEST ELECTRIC: CPT

## 2025-07-31 PROCEDURE — 85025 COMPLETE CBC W/AUTO DIFF WBC: CPT

## 2025-07-31 PROCEDURE — 80048 BASIC METABOLIC PNL TOTAL CA: CPT

## 2025-07-31 PROCEDURE — 93306 TTE W/DOPPLER COMPLETE: CPT

## 2025-07-31 PROCEDURE — 86900 BLOOD TYPING SEROLOGIC ABO: CPT

## 2025-07-31 PROCEDURE — 82553 CREATINE MB FRACTION: CPT

## 2025-07-31 PROCEDURE — 83550 IRON BINDING TEST: CPT

## 2025-07-31 PROCEDURE — 86850 RBC ANTIBODY SCREEN: CPT

## 2025-07-31 PROCEDURE — 83010 ASSAY OF HAPTOGLOBIN QUANT: CPT

## 2025-07-31 PROCEDURE — 85027 COMPLETE CBC AUTOMATED: CPT

## 2025-07-31 PROCEDURE — 80076 HEPATIC FUNCTION PANEL: CPT

## 2025-07-31 PROCEDURE — 84133 ASSAY OF URINE POTASSIUM: CPT

## 2025-07-31 PROCEDURE — 83615 LACTATE (LD) (LDH) ENZYME: CPT

## 2025-07-31 PROCEDURE — 82607 VITAMIN B-12: CPT

## 2025-07-31 PROCEDURE — 81001 URINALYSIS AUTO W/SCOPE: CPT

## 2025-07-31 PROCEDURE — 83036 HEMOGLOBIN GLYCOSYLATED A1C: CPT

## 2025-07-31 PROCEDURE — 36415 COLL VENOUS BLD VENIPUNCTURE: CPT

## 2025-07-31 PROCEDURE — 82330 ASSAY OF CALCIUM: CPT

## 2025-07-31 PROCEDURE — 84132 ASSAY OF SERUM POTASSIUM: CPT

## 2025-07-31 PROCEDURE — 84484 ASSAY OF TROPONIN QUANT: CPT

## 2025-07-31 PROCEDURE — 82728 ASSAY OF FERRITIN: CPT

## 2025-07-31 PROCEDURE — 99239 HOSP IP/OBS DSCHRG MGMT >30: CPT | Mod: GC

## 2025-07-31 PROCEDURE — 82962 GLUCOSE BLOOD TEST: CPT

## 2025-07-31 PROCEDURE — 99233 SBSQ HOSP IP/OBS HIGH 50: CPT

## 2025-07-31 PROCEDURE — 82977 ASSAY OF GGT: CPT

## 2025-07-31 PROCEDURE — 87040 BLOOD CULTURE FOR BACTERIA: CPT

## 2025-07-31 PROCEDURE — 84100 ASSAY OF PHOSPHORUS: CPT

## 2025-07-31 PROCEDURE — 83605 ASSAY OF LACTIC ACID: CPT

## 2025-07-31 PROCEDURE — 71045 X-RAY EXAM CHEST 1 VIEW: CPT

## 2025-07-31 PROCEDURE — 82570 ASSAY OF URINE CREATININE: CPT

## 2025-07-31 PROCEDURE — 86901 BLOOD TYPING SEROLOGIC RH(D): CPT

## 2025-07-31 PROCEDURE — 80061 LIPID PANEL: CPT

## 2025-07-31 PROCEDURE — 0225U NFCT DS DNA&RNA 21 SARSCOV2: CPT

## 2025-07-31 RX ADMIN — INSULIN LISPRO 4: 100 INJECTION, SOLUTION INTRAVENOUS; SUBCUTANEOUS at 18:29

## 2025-07-31 RX ADMIN — IRON SUCROSE 176.67 MILLIGRAM(S): 20 INJECTION, SOLUTION INTRAVENOUS at 06:07

## 2025-07-31 RX ADMIN — Medication 1 DROP(S): at 05:07

## 2025-07-31 RX ADMIN — METOPROLOL SUCCINATE 50 MILLIGRAM(S): 50 TABLET, EXTENDED RELEASE ORAL at 11:45

## 2025-07-31 RX ADMIN — Medication 1 DROP(S): at 16:10

## 2025-07-31 RX ADMIN — Medication 1 DROP(S): at 18:32

## 2025-07-31 RX ADMIN — Medication 1 DROP(S): at 06:07

## 2025-07-31 RX ADMIN — Medication 40 MILLIEQUIVALENT(S): at 10:01

## 2025-07-31 RX ADMIN — Medication 1 DROP(S): at 21:37

## 2025-07-31 RX ADMIN — Medication 1 DROP(S): at 10:00

## 2025-07-31 RX ADMIN — ROSUVASTATIN CALCIUM 5 MILLIGRAM(S): 20 TABLET, FILM COATED ORAL at 21:35

## 2025-07-31 RX ADMIN — LACTULOSE 10 GRAM(S): 10 SOLUTION ORAL at 16:12

## 2025-07-31 RX ADMIN — Medication 400 MILLIGRAM(S): at 00:54

## 2025-07-31 RX ADMIN — BRIMONIDINE TARTRATE, TIMOLOL MALEATE 1 DROP(S): 2; 5 SOLUTION/ DROPS TOPICAL at 11:45

## 2025-08-01 VITALS
SYSTOLIC BLOOD PRESSURE: 145 MMHG | HEART RATE: 80 BPM | RESPIRATION RATE: 18 BRPM | DIASTOLIC BLOOD PRESSURE: 67 MMHG | TEMPERATURE: 98 F | OXYGEN SATURATION: 98 %

## 2025-08-01 PROCEDURE — 85025 COMPLETE CBC W/AUTO DIFF WBC: CPT

## 2025-08-01 PROCEDURE — 82962 GLUCOSE BLOOD TEST: CPT

## 2025-08-01 PROCEDURE — 82803 BLOOD GASES ANY COMBINATION: CPT

## 2025-08-01 PROCEDURE — 83605 ASSAY OF LACTIC ACID: CPT

## 2025-08-01 PROCEDURE — 99291 CRITICAL CARE FIRST HOUR: CPT

## 2025-08-01 PROCEDURE — 82553 CREATINE MB FRACTION: CPT

## 2025-08-01 PROCEDURE — 71045 X-RAY EXAM CHEST 1 VIEW: CPT

## 2025-08-01 PROCEDURE — 99239 HOSP IP/OBS DSCHRG MGMT >30: CPT | Mod: GC

## 2025-08-01 PROCEDURE — 80048 BASIC METABOLIC PNL TOTAL CA: CPT

## 2025-08-01 PROCEDURE — 80053 COMPREHEN METABOLIC PANEL: CPT

## 2025-08-01 PROCEDURE — 84100 ASSAY OF PHOSPHORUS: CPT

## 2025-08-01 PROCEDURE — 82330 ASSAY OF CALCIUM: CPT

## 2025-08-01 PROCEDURE — 84466 ASSAY OF TRANSFERRIN: CPT

## 2025-08-01 PROCEDURE — 83735 ASSAY OF MAGNESIUM: CPT

## 2025-08-01 PROCEDURE — 82746 ASSAY OF FOLIC ACID SERUM: CPT

## 2025-08-01 PROCEDURE — 81001 URINALYSIS AUTO W/SCOPE: CPT

## 2025-08-01 PROCEDURE — 94640 AIRWAY INHALATION TREATMENT: CPT

## 2025-08-01 PROCEDURE — 86850 RBC ANTIBODY SCREEN: CPT

## 2025-08-01 PROCEDURE — 83540 ASSAY OF IRON: CPT

## 2025-08-01 PROCEDURE — 96374 THER/PROPH/DIAG INJ IV PUSH: CPT

## 2025-08-01 PROCEDURE — 84540 ASSAY OF URINE/UREA-N: CPT

## 2025-08-01 PROCEDURE — 81003 URINALYSIS AUTO W/O SCOPE: CPT

## 2025-08-01 PROCEDURE — 83036 HEMOGLOBIN GLYCOSYLATED A1C: CPT

## 2025-08-01 PROCEDURE — 84295 ASSAY OF SERUM SODIUM: CPT

## 2025-08-01 PROCEDURE — 80076 HEPATIC FUNCTION PANEL: CPT

## 2025-08-01 PROCEDURE — 83615 LACTATE (LD) (LDH) ENZYME: CPT

## 2025-08-01 PROCEDURE — 80061 LIPID PANEL: CPT

## 2025-08-01 PROCEDURE — 83880 ASSAY OF NATRIURETIC PEPTIDE: CPT

## 2025-08-01 PROCEDURE — 84484 ASSAY OF TROPONIN QUANT: CPT

## 2025-08-01 PROCEDURE — 96375 TX/PRO/DX INJ NEW DRUG ADDON: CPT

## 2025-08-01 PROCEDURE — 86923 COMPATIBILITY TEST ELECTRIC: CPT

## 2025-08-01 PROCEDURE — 87040 BLOOD CULTURE FOR BACTERIA: CPT

## 2025-08-01 PROCEDURE — 84443 ASSAY THYROID STIM HORMONE: CPT

## 2025-08-01 PROCEDURE — 76775 US EXAM ABDO BACK WALL LIM: CPT

## 2025-08-01 PROCEDURE — 85027 COMPLETE CBC AUTOMATED: CPT

## 2025-08-01 PROCEDURE — 82550 ASSAY OF CK (CPK): CPT

## 2025-08-01 PROCEDURE — 96376 TX/PRO/DX INJ SAME DRUG ADON: CPT

## 2025-08-01 PROCEDURE — 82728 ASSAY OF FERRITIN: CPT

## 2025-08-01 PROCEDURE — 86901 BLOOD TYPING SEROLOGIC RH(D): CPT

## 2025-08-01 PROCEDURE — 82607 VITAMIN B-12: CPT

## 2025-08-01 PROCEDURE — 36430 TRANSFUSION BLD/BLD COMPNT: CPT

## 2025-08-01 PROCEDURE — 83935 ASSAY OF URINE OSMOLALITY: CPT

## 2025-08-01 PROCEDURE — 83550 IRON BINDING TEST: CPT

## 2025-08-01 PROCEDURE — 0225U NFCT DS DNA&RNA 21 SARSCOV2: CPT

## 2025-08-01 PROCEDURE — 99232 SBSQ HOSP IP/OBS MODERATE 35: CPT

## 2025-08-01 PROCEDURE — 93005 ELECTROCARDIOGRAM TRACING: CPT

## 2025-08-01 PROCEDURE — 84156 ASSAY OF PROTEIN URINE: CPT

## 2025-08-01 PROCEDURE — 36415 COLL VENOUS BLD VENIPUNCTURE: CPT

## 2025-08-01 PROCEDURE — 82570 ASSAY OF URINE CREATININE: CPT

## 2025-08-01 PROCEDURE — 84133 ASSAY OF URINE POTASSIUM: CPT

## 2025-08-01 PROCEDURE — 84132 ASSAY OF SERUM POTASSIUM: CPT

## 2025-08-01 PROCEDURE — 86900 BLOOD TYPING SEROLOGIC ABO: CPT

## 2025-08-01 PROCEDURE — 82977 ASSAY OF GGT: CPT

## 2025-08-01 PROCEDURE — 84300 ASSAY OF URINE SODIUM: CPT

## 2025-08-01 PROCEDURE — P9016: CPT

## 2025-08-01 PROCEDURE — 83010 ASSAY OF HAPTOGLOBIN QUANT: CPT

## 2025-08-01 PROCEDURE — 93306 TTE W/DOPPLER COMPLETE: CPT

## 2025-08-01 RX ADMIN — Medication 1 DROP(S): at 05:38

## 2025-08-01 RX ADMIN — Medication 1 DROP(S): at 09:55

## 2025-08-01 RX ADMIN — BRIMONIDINE TARTRATE, TIMOLOL MALEATE 1 DROP(S): 2; 5 SOLUTION/ DROPS TOPICAL at 10:41

## 2025-08-05 LAB
CULTURE RESULTS: SIGNIFICANT CHANGE UP
SPECIMEN SOURCE: SIGNIFICANT CHANGE UP

## 2025-08-07 DIAGNOSIS — E11.40 TYPE 2 DIABETES MELLITUS WITH DIABETIC NEUROPATHY, UNSPECIFIED: ICD-10-CM

## 2025-08-07 DIAGNOSIS — J81.0 ACUTE PULMONARY EDEMA: ICD-10-CM

## 2025-08-07 DIAGNOSIS — J96.01 ACUTE RESPIRATORY FAILURE WITH HYPOXIA: ICD-10-CM

## 2025-08-07 DIAGNOSIS — I25.10 ATHEROSCLEROTIC HEART DISEASE OF NATIVE CORONARY ARTERY WITHOUT ANGINA PECTORIS: ICD-10-CM

## 2025-08-07 DIAGNOSIS — E78.5 HYPERLIPIDEMIA, UNSPECIFIED: ICD-10-CM

## 2025-08-07 DIAGNOSIS — I50.32 CHRONIC DIASTOLIC (CONGESTIVE) HEART FAILURE: ICD-10-CM

## 2025-08-07 DIAGNOSIS — E11.22 TYPE 2 DIABETES MELLITUS WITH DIABETIC CHRONIC KIDNEY DISEASE: ICD-10-CM

## 2025-08-07 DIAGNOSIS — Z86.718 PERSONAL HISTORY OF OTHER VENOUS THROMBOSIS AND EMBOLISM: ICD-10-CM

## 2025-08-07 DIAGNOSIS — Z79.01 LONG TERM (CURRENT) USE OF ANTICOAGULANTS: ICD-10-CM

## 2025-08-07 DIAGNOSIS — I13.0 HYPERTENSIVE HEART AND CHRONIC KIDNEY DISEASE WITH HEART FAILURE AND STAGE 1 THROUGH STAGE 4 CHRONIC KIDNEY DISEASE, OR UNSPECIFIED CHRONIC KIDNEY DISEASE: ICD-10-CM

## 2025-08-07 DIAGNOSIS — Z79.85 LONG-TERM (CURRENT) USE OF INJECTABLE NON-INSULIN ANTIDIABETIC DRUGS: ICD-10-CM

## 2025-08-07 DIAGNOSIS — Z79.84 LONG TERM (CURRENT) USE OF ORAL HYPOGLYCEMIC DRUGS: ICD-10-CM

## 2025-08-07 DIAGNOSIS — N17.9 ACUTE KIDNEY FAILURE, UNSPECIFIED: ICD-10-CM

## 2025-08-07 DIAGNOSIS — R33.8 OTHER RETENTION OF URINE: ICD-10-CM

## 2025-08-07 DIAGNOSIS — Z95.1 PRESENCE OF AORTOCORONARY BYPASS GRAFT: ICD-10-CM

## 2025-08-07 DIAGNOSIS — N40.1 BENIGN PROSTATIC HYPERPLASIA WITH LOWER URINARY TRACT SYMPTOMS: ICD-10-CM

## 2025-08-07 DIAGNOSIS — E87.5 HYPERKALEMIA: ICD-10-CM

## (undated) DEVICE — DRSG DERMABOND 0.7ML

## (undated) DEVICE — Device

## (undated) DEVICE — SUT VICRYL 2-0 27" CT-2 UNDYED

## (undated) DEVICE — ELCTR BOVIE PENCIL BLADE 10FT

## (undated) DEVICE — PREP BETADINE KIT

## (undated) DEVICE — INSUFFLATION NDL COVIDIEN SURGINEEDLE VERESS 120MM

## (undated) DEVICE — DRSG SUPPORTER ADULT 3" WAISTBAND MED

## (undated) DEVICE — SUT VICRYL 3-0 27" SH UNDYED

## (undated) DEVICE — SUT ETHILON 5-0 18" P-3

## (undated) DEVICE — SYR LUER LOK 30CC

## (undated) DEVICE — PACK GENERAL CLOSING

## (undated) DEVICE — PREP BETADINE SPONGE STICKS

## (undated) DEVICE — DRAPE IOBAN 33" X 23"

## (undated) DEVICE — PACK HAND

## (undated) DEVICE — PACK GENERAL MINOR

## (undated) DEVICE — DRAPE LEGGINGS XL

## (undated) DEVICE — TOURNIQUET CUFF 24" DUAL PORT SINGLE BLADDER W PLC (BLACK)

## (undated) DEVICE — DRAPE LIGHT HANDLE COVER (BLUE)

## (undated) DEVICE — TROCAR COVIDIEN VERSAPORT BLADELESS OPTICAL 5MM STANDARD

## (undated) DEVICE — SNARE HOT COLD 15MM DISP

## (undated) DEVICE — TIP METZENBAUM SCISSOR MONOPOLAR ENDOCUT (ORANGE)

## (undated) DEVICE — GLV 7.5 PROTEXIS (WHITE)

## (undated) DEVICE — SUT VICRYL 0 18" ENDOLOOP LIGATURE

## (undated) DEVICE — ENDOPATH 5MM CVD SCISSOR W MONOPOLAR CAUTERY DISP

## (undated) DEVICE — KNIFE DUAL J UPPER

## (undated) DEVICE — MARKING PEN W RULER

## (undated) DEVICE — SUT MONOCRYL 4-0 27" PS-2 UNDYED

## (undated) DEVICE — DRSG 4 X 8

## (undated) DEVICE — GOWN XL

## (undated) DEVICE — SUT MONOCRYL 5-0 18" P-3 UNDYED

## (undated) DEVICE — KIT ENDO PROCEDURE CUST W/VLV

## (undated) DEVICE — DRSG STERISTRIPS 0.5 X 4"

## (undated) DEVICE — VENODYNE/SCD SLEEVE CALF MEDIUM

## (undated) DEVICE — SUT VICRYL 2-0 27" SH

## (undated) DEVICE — GLV 8 PROTEXIS (WHITE)

## (undated) DEVICE — TUBING STRYKER PNEUMOCLEAR HIGH FLOW

## (undated) DEVICE — TUBING SUCTION NONCONDUCTIVE 6MM X 12FT

## (undated) DEVICE — D HELP - CLEARVIEW CLEARIFY SYSTEM

## (undated) DEVICE — MARKER ENDO SPOT EX

## (undated) DEVICE — PREP CHLORAPREP HI-LITE ORANGE 26ML

## (undated) DEVICE — SOL ANTI FOG (FRED)

## (undated) DEVICE — DRAIN PENROSE .25" X 18" LATEX

## (undated) DEVICE — DRSG SUPPORTER ADULT 3" WAISTBAND LRG

## (undated) DEVICE — PACK GENERAL LAPAROSCOPY

## (undated) DEVICE — TOURNIQUET CUFF 18" DUAL PORT SINGLE BLADDER W PLC  (BLACK)

## (undated) DEVICE — DRSG TEGADERM 4X4.75"

## (undated) DEVICE — TROCAR COVIDIEN VERSAONE FIXATION CANNULA 5MM

## (undated) DEVICE — DRAPE FLUID WARMER 44 X 66"

## (undated) DEVICE — WARMING BLANKET LOWER ADULT

## (undated) DEVICE — NDL INJ SCLERO INTERJECT 23G

## (undated) DEVICE — STRATOS ENDOSCOPIC CARPAL TUNNEL RELEASE SYSTEM

## (undated) DEVICE — SYS ENDO STRATOS RELEASE 30 DEG 4MM

## (undated) DEVICE — FORCEP RADIAL JAW 4 W NDL 2.2MM 2.8MM 240CM ORANGE DISP

## (undated) DEVICE — DRSG TEGADERM 2.5X3"

## (undated) DEVICE — POSITIONER OA ARM ELEVATOR DISP

## (undated) DEVICE — LUBRICATING JELLY ONESHOT 1.25OZ

## (undated) DEVICE — SUT MONOCRYL 4-0 18" PS-2

## (undated) DEVICE — SNARE ENDO POLY CAPTIVATOR II 33MM

## (undated) DEVICE — FOLEY TRAY 16FR 5CC LF UMETER CLOSED

## (undated) DEVICE — SUT PROLENE 2-0 30" CT-2

## (undated) DEVICE — SUT VICRYL 2-0 18" TIES

## (undated) DEVICE — DRSG 2X2

## (undated) DEVICE — SUT VICRYL 0 27" UR-6

## (undated) DEVICE — STAPLER SKIN VISI-STAT 35 WIDE

## (undated) DEVICE — GELPORT LAPAROSCOPIC SYSTEM

## (undated) DEVICE — SYR CONTROL LUER LOK 10CC

## (undated) DEVICE — SUT VICRYL 3-0 18" TIES UNDYED

## (undated) DEVICE — SOL ANTI FOG

## (undated) DEVICE — LIGASURE MARYLAND 37CM

## (undated) DEVICE — DRAPE 3/4 SHEET 52X76"

## (undated) DEVICE — POSITIONER FOAM EGG CRATE ULNAR 2PCS (PINK)

## (undated) DEVICE — SUPP ATHLETIC MALE XLG 44-55IN

## (undated) DEVICE — DRAPE 1/2 SHEET 40X57"